# Patient Record
Sex: FEMALE | Race: BLACK OR AFRICAN AMERICAN | HISPANIC OR LATINO | Employment: UNEMPLOYED | URBAN - METROPOLITAN AREA
[De-identification: names, ages, dates, MRNs, and addresses within clinical notes are randomized per-mention and may not be internally consistent; named-entity substitution may affect disease eponyms.]

---

## 2021-07-02 ENCOUNTER — APPOINTMENT (EMERGENCY)
Dept: RADIOLOGY | Facility: HOSPITAL | Age: 46
End: 2021-07-02

## 2021-07-02 ENCOUNTER — HOSPITAL ENCOUNTER (EMERGENCY)
Facility: HOSPITAL | Age: 46
Discharge: HOME | End: 2021-07-02
Attending: EMERGENCY MEDICINE | Admitting: EMERGENCY MEDICINE
Payer: MEDICAID

## 2021-07-02 VITALS
OXYGEN SATURATION: 100 % | RESPIRATION RATE: 16 BRPM | DIASTOLIC BLOOD PRESSURE: 84 MMHG | TEMPERATURE: 96.2 F | SYSTOLIC BLOOD PRESSURE: 125 MMHG | HEART RATE: 75 BPM | WEIGHT: 169.75 LBS

## 2021-07-02 DIAGNOSIS — R10.9 ABDOMINAL PAIN: Primary | ICD-10-CM

## 2021-07-02 LAB
ALBUMIN SERPL BCP-MCNC: 4.2 G/DL (ref 3–5.2)
ALP SERPL-CCNC: 46 U/L (ref 43–122)
ALT SERPL W P-5'-P-CCNC: 12 U/L
ANION GAP SERPL CALCULATED.3IONS-SCNC: 11 MMOL/L (ref 5–14)
AST SERPL W P-5'-P-CCNC: 18 U/L (ref 14–36)
ATRIAL RATE: 81 BPM
BACTERIA UR QL AUTO: ABNORMAL /HPF
BILIRUB SERPL-MCNC: 0.6 MG/DL
BILIRUB UR QL STRIP: NEGATIVE
BUN SERPL-MCNC: 13 MG/DL (ref 5–25)
CALCIUM SERPL-MCNC: 9.2 MG/DL (ref 8.4–10.2)
CHLORIDE SERPL-SCNC: 105 MMOL/L (ref 97–108)
CLARITY UR: ABNORMAL
CO2 SERPL-SCNC: 23 MMOL/L (ref 22–30)
COLOR UR: ABNORMAL
CREAT SERPL-MCNC: 0.54 MG/DL (ref 0.6–1.2)
EOSINOPHIL # BLD AUTO: 0.16 THOUSAND/UL (ref 0–0.4)
EOSINOPHIL NFR BLD MANUAL: 2 % (ref 0–6)
ERYTHROCYTE [DISTWIDTH] IN BLOOD BY AUTOMATED COUNT: 12.8 %
EXT PREG TEST URINE: NEGATIVE
EXT. CONTROL ED NAV: NORMAL
GFR SERPL CREATININE-BSD FRML MDRD: 114 ML/MIN/1.73SQ M
GLUCOSE SERPL-MCNC: 105 MG/DL (ref 70–99)
GLUCOSE UR STRIP-MCNC: NEGATIVE MG/DL
HCT VFR BLD AUTO: 38.2 % (ref 36–46)
HGB BLD-MCNC: 13.6 G/DL (ref 12–16)
HGB UR QL STRIP.AUTO: 250
KETONES UR STRIP-MCNC: NEGATIVE MG/DL
LEUKOCYTE ESTERASE UR QL STRIP: NEGATIVE
LIPASE SERPL-CCNC: 143 U/L (ref 23–300)
LYMPHOCYTES # BLD AUTO: 3.44 THOUSAND/UL (ref 0.5–4)
LYMPHOCYTES # BLD AUTO: 42 % (ref 25–45)
MCH RBC QN AUTO: 33.5 PG (ref 26–34)
MCHC RBC AUTO-ENTMCNC: 35.6 G/DL (ref 31–36)
MCV RBC AUTO: 94 FL (ref 80–100)
MONOCYTES # BLD AUTO: 0.98 THOUSAND/UL (ref 0.2–0.9)
MONOCYTES NFR BLD AUTO: 12 % (ref 1–10)
NEUTS SEG # BLD: 3.61 THOUSAND/UL (ref 1.8–7.8)
NEUTS SEG NFR BLD AUTO: 44 %
NITRITE UR QL STRIP: NEGATIVE
NON-SQ EPI CELLS URNS QL MICRO: ABNORMAL /HPF
P AXIS: 55 DEGREES
PH UR STRIP.AUTO: 7 [PH]
PLATELET # BLD AUTO: 280 THOUSANDS/UL (ref 150–450)
PLATELET BLD QL SMEAR: ADEQUATE
PMV BLD AUTO: 8.4 FL (ref 8.9–12.7)
POTASSIUM SERPL-SCNC: 3.5 MMOL/L (ref 3.6–5)
PR INTERVAL: 172 MS
PROT SERPL-MCNC: 7.7 G/DL (ref 5.9–8.4)
PROT UR STRIP-MCNC: ABNORMAL MG/DL
QRS AXIS: 17 DEGREES
QRSD INTERVAL: 80 MS
QT INTERVAL: 398 MS
QTC INTERVAL: 462 MS
RBC # BLD AUTO: 4.06 MILLION/UL (ref 4–5.2)
RBC #/AREA URNS AUTO: ABNORMAL /HPF
RBC MORPH BLD: NORMAL
SODIUM SERPL-SCNC: 139 MMOL/L (ref 137–147)
SP GR UR STRIP.AUTO: 1.01 (ref 1–1.04)
T WAVE AXIS: 46 DEGREES
TOTAL CELLS COUNTED SPEC: 100
TROPONIN I SERPL-MCNC: <0.01 NG/ML (ref 0–0.03)
UROBILINOGEN UA: NEGATIVE MG/DL
VENTRICULAR RATE: 81 BPM
WBC # BLD AUTO: 8.2 THOUSAND/UL (ref 4.5–11)
WBC #/AREA URNS AUTO: ABNORMAL /HPF

## 2021-07-02 PROCEDURE — 71045 X-RAY EXAM CHEST 1 VIEW: CPT

## 2021-07-02 PROCEDURE — 85027 COMPLETE CBC AUTOMATED: CPT | Performed by: EMERGENCY MEDICINE

## 2021-07-02 PROCEDURE — 83690 ASSAY OF LIPASE: CPT | Performed by: EMERGENCY MEDICINE

## 2021-07-02 PROCEDURE — 96361 HYDRATE IV INFUSION ADD-ON: CPT

## 2021-07-02 PROCEDURE — 99284 EMERGENCY DEPT VISIT MOD MDM: CPT | Performed by: EMERGENCY MEDICINE

## 2021-07-02 PROCEDURE — 93005 ELECTROCARDIOGRAM TRACING: CPT

## 2021-07-02 PROCEDURE — 96375 TX/PRO/DX INJ NEW DRUG ADDON: CPT

## 2021-07-02 PROCEDURE — 85007 BL SMEAR W/DIFF WBC COUNT: CPT | Performed by: EMERGENCY MEDICINE

## 2021-07-02 PROCEDURE — 80053 COMPREHEN METABOLIC PANEL: CPT | Performed by: EMERGENCY MEDICINE

## 2021-07-02 PROCEDURE — 99284 EMERGENCY DEPT VISIT MOD MDM: CPT

## 2021-07-02 PROCEDURE — 96374 THER/PROPH/DIAG INJ IV PUSH: CPT

## 2021-07-02 PROCEDURE — 84484 ASSAY OF TROPONIN QUANT: CPT | Performed by: EMERGENCY MEDICINE

## 2021-07-02 PROCEDURE — 81025 URINE PREGNANCY TEST: CPT | Performed by: EMERGENCY MEDICINE

## 2021-07-02 PROCEDURE — 36415 COLL VENOUS BLD VENIPUNCTURE: CPT | Performed by: EMERGENCY MEDICINE

## 2021-07-02 PROCEDURE — 81001 URINALYSIS AUTO W/SCOPE: CPT | Performed by: EMERGENCY MEDICINE

## 2021-07-02 PROCEDURE — 93010 ELECTROCARDIOGRAM REPORT: CPT | Performed by: INTERNAL MEDICINE

## 2021-07-02 RX ORDER — ONDANSETRON 4 MG/1
4 TABLET, ORALLY DISINTEGRATING ORAL EVERY 8 HOURS PRN
Qty: 20 TABLET | Refills: 0 | Status: SHIPPED | OUTPATIENT
Start: 2021-07-02

## 2021-07-02 RX ORDER — ONDANSETRON 2 MG/ML
4 INJECTION INTRAMUSCULAR; INTRAVENOUS ONCE
Status: COMPLETED | OUTPATIENT
Start: 2021-07-02 | End: 2021-07-02

## 2021-07-02 RX ORDER — PANTOPRAZOLE SODIUM 20 MG/1
20 TABLET, DELAYED RELEASE ORAL DAILY
Qty: 20 TABLET | Refills: 0 | Status: SHIPPED | OUTPATIENT
Start: 2021-07-02

## 2021-07-02 RX ADMIN — MORPHINE SULFATE 2 MG: 2 INJECTION, SOLUTION INTRAMUSCULAR; INTRAVENOUS at 01:32

## 2021-07-02 RX ADMIN — ONDANSETRON 4 MG: 2 INJECTION INTRAMUSCULAR; INTRAVENOUS at 01:30

## 2021-07-02 RX ADMIN — SODIUM CHLORIDE 1000 ML: 0.9 INJECTION, SOLUTION INTRAVENOUS at 01:28

## 2021-07-02 NOTE — ED NOTES
Patient reports that her pain is gone and she is resting quietly  Dr Lucy Irvin in to see patient to review results of testing done and follow up and discharge instructions with patient using Burmese interpretor       Vito Doyle RN  07/02/21 2923

## 2021-07-02 NOTE — ED NOTES
Patient reports that her pain has improved since receiving the morphine  States she is feeling better  Has given permission to speak with her sister       Tere Catalan RN  07/02/21 4901

## 2021-07-02 NOTE — ED PROVIDER NOTES
History  Chief Complaint   Patient presents with    Abdominal Pain     Abdominal pain that started 40 minutes ago, mid-epigastric pain, did not take anything at home for pain  59-year-old female presents with complaint of abdominal pain  She states that approximately 45 minutes prior to arrival she awoke with epigastric pain  The patient reports that she felt warm but did not check her temperature  She has had nausea but no vomiting or diarrhea  She took no medications for her pain  Abdominal Pain  Pain location:  Epigastric  Pain quality: aching and pressure    Pain radiates to:  Does not radiate  Pain severity:  Severe  Onset quality:  Sudden  Duration: 45 minutes  Timing:  Constant  Progression:  Unchanged  Chronicity:  New  Relieved by:  Nothing  Worsened by:  Nothing  Ineffective treatments:  None tried  Associated symptoms: anorexia and nausea    Associated symptoms: no belching, no chest pain, no chills, no constipation, no cough, no diarrhea, no dysuria, no fatigue, no fever, no flatus, no hematemesis, no hematochezia, no hematuria, no melena, no shortness of breath, no sore throat and no vomiting        None       Past Medical History:   Diagnosis Date    H  pylori infection     Hypertension        History reviewed  No pertinent surgical history  History reviewed  No pertinent family history  I have reviewed and agree with the history as documented  E-Cigarette/Vaping     E-Cigarette/Vaping Substances     Social History     Tobacco Use    Smoking status: Never Smoker    Smokeless tobacco: Never Used   Substance Use Topics    Alcohol use: Not Currently    Drug use: Never       Review of Systems   Constitutional: Negative for chills, fatigue and fever  HENT: Negative for postnasal drip, sinus pain, sore throat and trouble swallowing  Eyes: Negative for redness and itching  Respiratory: Negative for cough, chest tightness, shortness of breath and wheezing      Cardiovascular: Negative for chest pain and leg swelling  Gastrointestinal: Positive for abdominal pain, anorexia and nausea  Negative for constipation, diarrhea, flatus, hematemesis, hematochezia, melena and vomiting  Endocrine: Negative  Genitourinary: Negative for difficulty urinating, dysuria and hematuria  Musculoskeletal: Negative for back pain and myalgias  Skin: Negative for rash  Allergic/Immunologic: Negative  Neurological: Negative for dizziness, numbness and headaches  Hematological: Negative  Psychiatric/Behavioral: Negative  All other systems reviewed and are negative  Physical Exam  Physical Exam  Vitals and nursing note reviewed  Constitutional:       General: She is in acute distress  Appearance: Normal appearance  She is well-developed  She is not ill-appearing or toxic-appearing  HENT:      Head: Normocephalic and atraumatic  Right Ear: External ear normal       Left Ear: External ear normal       Nose: Nose normal  No congestion  Mouth/Throat:      Mouth: Mucous membranes are moist       Pharynx: Oropharynx is clear  Eyes:      Conjunctiva/sclera: Conjunctivae normal       Pupils: Pupils are equal, round, and reactive to light  Cardiovascular:      Rate and Rhythm: Normal rate and regular rhythm  Heart sounds: Normal heart sounds  Pulmonary:      Effort: Pulmonary effort is normal  No respiratory distress  Breath sounds: Normal breath sounds  No wheezing  Abdominal:      General: Bowel sounds are normal       Palpations: Abdomen is soft  Tenderness: There is abdominal tenderness in the epigastric area  There is no guarding  Musculoskeletal:         General: No tenderness or deformity  Cervical back: Normal range of motion and neck supple  No rigidity  Skin:     General: Skin is warm and dry  Capillary Refill: Capillary refill takes less than 2 seconds  Findings: No rash     Neurological:      General: No focal deficit present  Mental Status: She is alert and oriented to person, place, and time     Psychiatric:         Mood and Affect: Mood normal          Behavior: Behavior normal          Vital Signs  ED Triage Vitals [07/02/21 0110]   Temperature Pulse Respirations Blood Pressure SpO2   (!) 96 2 °F (35 7 °C) 95 18 (!) 174/106 99 %      Temp Source Heart Rate Source Patient Position - Orthostatic VS BP Location FiO2 (%)   Tympanic Monitor Lying Left arm --      Pain Score       Worst Possible Pain           Vitals:    07/02/21 0137 07/02/21 0145 07/02/21 0200 07/02/21 0215   BP: 135/91 133/95 131/87 132/88   Pulse: 75 79 72 80   Patient Position - Orthostatic VS: Lying  Sitting Sitting         Visual Acuity      ED Medications  Medications   sodium chloride 0 9 % bolus 1,000 mL (1,000 mL Intravenous New Bag 7/2/21 0128)   ondansetron (ZOFRAN) injection 4 mg (4 mg Intravenous Given 7/2/21 0130)   morphine injection 2 mg (2 mg Intravenous Given 7/2/21 0132)       Diagnostic Studies  Results Reviewed     Procedure Component Value Units Date/Time    Troponin I [[id number]]  (Normal) Collected: 07/02/21 0127    Lab Status: Final result Specimen: Blood from Arm, Right Updated: 07/02/21 0155     Troponin I <0 01 ng/mL     Manual Differential (Non Wam) [[id number]]  (Abnormal) Collected: 07/02/21 0127    Lab Status: Final result Specimen: Blood from Arm, Right Updated: 07/02/21 0154     Segmented % 44 %      Lymphocytes % 42 %      Monocytes % 12 %      Eosinophils, % 2 %      Neutrophils Absolute 3 61 Thousand/uL      Lymphocytes Absolute 3 44 Thousand/uL      Monocytes Absolute 0 98 Thousand/uL      Eosinophils Absolute 0 16 Thousand/uL      Total Counted 100     RBC Morphology Normal     Platelet Estimate Adequate    Urine Microscopic [[id number]]  (Abnormal) Collected: 07/02/21 0139    Lab Status: Final result Specimen: Urine, Other Updated: 07/02/21 0154     RBC, UA Innumerable /hpf      WBC, UA 1-2 /hpf      Epithelial Cells Occasional /hpf      Bacteria, UA Innumerable /hpf     UA (URINE) with reflex to Scope [[id number]]  (Abnormal) Collected: 07/02/21 0139    Lab Status: Final result Specimen: Urine, Other Updated: 07/02/21 0153     Color, UA Straw     Clarity, UA Cloudy     Specific Gravity, UA 1 015     pH, UA 7 0     Leukocytes, UA Negative     Nitrite, UA Negative     Protein, UA 30 (1+) mg/dl      Glucose, UA Negative mg/dl      Ketones, UA Negative mg/dl      Bilirubin, UA Negative     Blood,  0     UROBILINOGEN UA Negative mg/dL     Lipase [[id number]]  (Normal) Collected: 07/02/21 0127    Lab Status: Final result Specimen: Blood from Arm, Right Updated: 07/02/21 0147     Lipase 143 u/L     Comprehensive metabolic panel [[id number]]  (Abnormal) Collected: 07/02/21 0127    Lab Status: Final result Specimen: Blood from Arm, Right Updated: 07/02/21 0146     Sodium 139 mmol/L      Potassium 3 5 mmol/L      Chloride 105 mmol/L      CO2 23 mmol/L      ANION GAP 11 mmol/L      BUN 13 mg/dL      Creatinine 0 54 mg/dL      Glucose 105 mg/dL      Calcium 9 2 mg/dL      AST 18 U/L      ALT 12 U/L      Alkaline Phosphatase 46 U/L      Total Protein 7 7 g/dL      Albumin 4 2 g/dL      Total Bilirubin 0 60 mg/dL      eGFR 114 ml/min/1 73sq m     Narrative:      [place] guidelines for Chronic Kidney Disease (CKD):     Stage 1 with normal or high GFR (GFR > 90 mL/min/1 73 square meters)    Stage 2 Mild CKD (GFR = 60-89 mL/min/1 73 square meters)    Stage 3A Moderate CKD (GFR = 45-59 mL/min/1 73 square meters)    Stage 3B Moderate CKD (GFR = 30-44 mL/min/1 73 square meters)    Stage 4 Severe CKD (GFR = 15-29 mL/min/1 73 square meters)    Stage 5 End Stage CKD (GFR <15 mL/min/1 73 square meters)  Note: GFR calculation is accurate only with a steady state creatinine    CBC and differential [[id number]]  (Abnormal) Collected: 07/02/21 0127    Lab Status: Final result Specimen: Blood from Arm, Right Updated: 07/02/21 0135     WBC 8 20 Thousand/uL      RBC 4 06 Million/uL      Hemoglobin 13 6 g/dL      Hematocrit 38 2 %      MCV 94 fL      MCH 33 5 pg      MCHC 35 6 g/dL      RDW 12 8 %      MPV 8 4 fL      Platelets 648 Thousands/uL     POCT pregnancy, urine [[id number]]  (Normal) Resulted: 07/02/21 0132    Lab Status: Final result Updated: 07/02/21 0132     EXT PREG TEST UR (Ref: Negative) negative     Control valid                 XR chest 1 view portable    (Results Pending)              Procedures  ECG 12 Lead Documentation Only    Date/Time: 7/2/2021 1:36 AM  Performed by: Alma Delia Johnson DO  Authorized by: Alma Delia Johnson DO     ECG reviewed by me, the ED Provider: yes    Patient location:  ED  Rate:     ECG rate:  81    ECG rate assessment: normal    Rhythm:     Rhythm: sinus rhythm    Ectopy:     Ectopy: none    QRS:     QRS axis:  Normal  Conduction:     Conduction: normal    ST segments:     ST segments:  Normal  T waves:     T waves: non-specific               ED Course                             SBIRT 20yo+      Most Recent Value   SBIRT (22 yo +)   In order to provide better care to our patients, we are screening all of our patients for alcohol and drug use  Would it be okay to ask you these screening questions? Yes Filed at: 07/02/2021 0121   Initial Alcohol Screen: US AUDIT-C    1  How often do you have a drink containing alcohol?  0 Filed at: 07/02/2021 0121   2  How many drinks containing alcohol do you have on a typical day you are drinking? 0 Filed at: 07/02/2021 0121   3a  Male UNDER 65: How often do you have five or more drinks on one occasion? 0 Filed at: 07/02/2021 0121   3b  FEMALE Any Age, or MALE 65+: How often do you have 4 or more drinks on one occassion? 0 Filed at: 07/02/2021 0121   Audit-C Score  0 Filed at: 07/02/2021 0121   DARREN: How many times in the past year have you    Used an illegal drug or used a prescription medication for non-medical reasons?   Never Filed at: 07/02/2021 5                    MDM  Number of Diagnoses or Management Options  Abdominal pain  Diagnosis management comments: 79-year-old female presents with complaint of abdominal pain along with nausea  Pain awoke her from sleep and had continued until time of presentation  Patient does report having history of gastritis and an H pylori infection  After receiving meds and fluids, the patient's symptoms resolved  Patient recently relocated from [place] and is in the process of establishing with a primary care provider  She requested medications for her stomach and she will be following up closely as an outpatient  She is aware of reasons return to the ER  Amount and/or Complexity of Data Reviewed  Clinical lab tests: ordered and reviewed  Tests in the radiology section of CPT®: ordered and reviewed  Independent visualization of images, tracings, or specimens: yes        Disposition  Final diagnoses:   Abdominal pain     Time reflects when diagnosis was documented in both MDM as applicable and the Disposition within this note     Time User Action Codes Description Comment    7/2/2021  2:27 AM Dash Browne Add [R10 9] Abdominal pain       ED Disposition     ED Disposition Condition Date/Time Comment    Discharge Stable Fri Jul 2, 2021  2:27 AM Brayden Knapp discharge to home/self care              Follow-up Information     Follow up With Specialties Details Why [address] Lacy Hogue MD Family Medicine   [address] [place] [address]  [phone]            Patient's Medications   Discharge Prescriptions    ONDANSETRON (ZOFRAN-ODT) 4 MG DISINTEGRATING TABLET    Take 1 tablet (4 mg total) by mouth every 8 (eight) hours as needed for nausea or vomiting       Start Date: 7/2/2021  End Date: --       Order Dose: 4 mg       Quantity: 20 tablet    Refills: 0    PANTOPRAZOLE (PROTONIX) 20 MG TABLET    Take 1 tablet (20 mg total) by mouth daily       Start Date: 7/2/2021  End Date: -- Order Dose: 20 mg       Quantity: 20 tablet    Refills: 0     No discharge procedures on file      PDMP Review     None          ED Provider  Electronically Signed by           Marah Howe DO  07/02/21 2519

## 2022-08-11 ENCOUNTER — APPOINTMENT (OUTPATIENT)
Dept: LAB | Facility: HOSPITAL | Age: 47
End: 2022-08-11
Payer: COMMERCIAL

## 2022-08-11 ENCOUNTER — OFFICE VISIT (OUTPATIENT)
Dept: FAMILY MEDICINE CLINIC | Facility: CLINIC | Age: 47
End: 2022-08-11

## 2022-08-11 VITALS
SYSTOLIC BLOOD PRESSURE: 136 MMHG | WEIGHT: 167 LBS | TEMPERATURE: 96.7 F | RESPIRATION RATE: 16 BRPM | OXYGEN SATURATION: 98 % | DIASTOLIC BLOOD PRESSURE: 90 MMHG | HEART RATE: 80 BPM | BODY MASS INDEX: 28.51 KG/M2 | HEIGHT: 64 IN

## 2022-08-11 DIAGNOSIS — N93.8 DYSFUNCTIONAL UTERINE BLEEDING: Primary | ICD-10-CM

## 2022-08-11 DIAGNOSIS — E78.5 HYPERLIPIDEMIA, UNSPECIFIED HYPERLIPIDEMIA TYPE: ICD-10-CM

## 2022-08-11 DIAGNOSIS — I10 PRIMARY HYPERTENSION: ICD-10-CM

## 2022-08-11 LAB
ALBUMIN SERPL BCP-MCNC: 4.2 G/DL (ref 3.5–5)
ALP SERPL-CCNC: 55 U/L (ref 43–122)
ALT SERPL W P-5'-P-CCNC: 12 U/L
ANION GAP SERPL CALCULATED.3IONS-SCNC: 7 MMOL/L (ref 5–14)
AST SERPL W P-5'-P-CCNC: 16 U/L (ref 14–36)
BASOPHILS # BLD AUTO: 0.03 THOUSANDS/ΜL (ref 0–0.1)
BASOPHILS NFR BLD AUTO: 0 % (ref 0–1)
BILIRUB SERPL-MCNC: 0.43 MG/DL (ref 0.2–1)
BUN SERPL-MCNC: 15 MG/DL (ref 5–25)
CALCIUM SERPL-MCNC: 8.8 MG/DL (ref 8.4–10.2)
CHLORIDE SERPL-SCNC: 106 MMOL/L (ref 96–108)
CO2 SERPL-SCNC: 25 MMOL/L (ref 21–32)
CREAT SERPL-MCNC: 0.76 MG/DL (ref 0.6–1.2)
EOSINOPHIL # BLD AUTO: 0.14 THOUSAND/ΜL (ref 0–0.61)
EOSINOPHIL NFR BLD AUTO: 2 % (ref 0–6)
ERYTHROCYTE [DISTWIDTH] IN BLOOD BY AUTOMATED COUNT: 12.5 % (ref 11.6–15.1)
FERRITIN SERPL-MCNC: 14 NG/ML (ref 8–388)
GFR SERPL CREATININE-BSD FRML MDRD: 93 ML/MIN/1.73SQ M
GLUCOSE SERPL-MCNC: 96 MG/DL (ref 70–99)
HCT VFR BLD AUTO: 37.7 % (ref 34.8–46.1)
HGB BLD-MCNC: 12.5 G/DL (ref 11.5–15.4)
IMM GRANULOCYTES # BLD AUTO: 0.02 THOUSAND/UL (ref 0–0.2)
IMM GRANULOCYTES NFR BLD AUTO: 0 % (ref 0–2)
IRON SATN MFR SERPL: 17 % (ref 15–50)
IRON SERPL-MCNC: 68 UG/DL (ref 50–170)
LYMPHOCYTES # BLD AUTO: 3.17 THOUSANDS/ΜL (ref 0.6–4.47)
LYMPHOCYTES NFR BLD AUTO: 39 % (ref 14–44)
MCH RBC QN AUTO: 31.1 PG (ref 26.8–34.3)
MCHC RBC AUTO-ENTMCNC: 33.2 G/DL (ref 31.4–37.4)
MCV RBC AUTO: 94 FL (ref 82–98)
MONOCYTES # BLD AUTO: 0.79 THOUSAND/ΜL (ref 0.17–1.22)
MONOCYTES NFR BLD AUTO: 10 % (ref 4–12)
NEUTROPHILS # BLD AUTO: 4 THOUSANDS/ΜL (ref 1.85–7.62)
NEUTS SEG NFR BLD AUTO: 49 % (ref 43–75)
NRBC BLD AUTO-RTO: 0 /100 WBCS
PLATELET # BLD AUTO: 289 THOUSANDS/UL (ref 149–390)
PMV BLD AUTO: 9.8 FL (ref 8.9–12.7)
POTASSIUM SERPL-SCNC: 4.1 MMOL/L (ref 3.5–5.3)
PROT SERPL-MCNC: 7.7 G/DL (ref 6.4–8.4)
RBC # BLD AUTO: 4.02 MILLION/UL (ref 3.81–5.12)
SODIUM SERPL-SCNC: 138 MMOL/L (ref 135–147)
TIBC SERPL-MCNC: 410 UG/DL (ref 250–450)
WBC # BLD AUTO: 8.15 THOUSAND/UL (ref 4.31–10.16)

## 2022-08-11 PROCEDURE — 83540 ASSAY OF IRON: CPT | Performed by: FAMILY MEDICINE

## 2022-08-11 PROCEDURE — 36415 COLL VENOUS BLD VENIPUNCTURE: CPT | Performed by: FAMILY MEDICINE

## 2022-08-11 PROCEDURE — 85025 COMPLETE CBC W/AUTO DIFF WBC: CPT | Performed by: FAMILY MEDICINE

## 2022-08-11 PROCEDURE — 83550 IRON BINDING TEST: CPT | Performed by: FAMILY MEDICINE

## 2022-08-11 PROCEDURE — 3725F SCREEN DEPRESSION PERFORMED: CPT | Performed by: FAMILY MEDICINE

## 2022-08-11 PROCEDURE — 82728 ASSAY OF FERRITIN: CPT | Performed by: FAMILY MEDICINE

## 2022-08-11 PROCEDURE — 80053 COMPREHEN METABOLIC PANEL: CPT | Performed by: FAMILY MEDICINE

## 2022-08-11 PROCEDURE — 99204 OFFICE O/P NEW MOD 45 MIN: CPT | Performed by: FAMILY MEDICINE

## 2022-08-11 RX ORDER — AMLODIPINE BESYLATE 5 MG/1
5 TABLET ORAL DAILY
Qty: 30 TABLET | Refills: 2 | Status: SHIPPED | OUTPATIENT
Start: 2022-08-11 | End: 2022-09-28 | Stop reason: SDUPTHER

## 2022-08-11 RX ORDER — ATORVASTATIN CALCIUM 10 MG/1
10 TABLET, FILM COATED ORAL DAILY
Qty: 30 TABLET | Refills: 2 | Status: SHIPPED | OUTPATIENT
Start: 2022-08-11 | End: 2022-09-28 | Stop reason: SDUPTHER

## 2022-08-11 RX ORDER — MEDROXYPROGESTERONE ACETATE 5 MG/1
5 TABLET ORAL DAILY
Qty: 30 TABLET | Refills: 1 | Status: SHIPPED | OUTPATIENT
Start: 2022-08-11 | End: 2022-08-22

## 2022-08-11 NOTE — PROGRESS NOTES
Assessment/Plan:    Diagnoses and all orders for this visit:    Dysfunctional uterine bleeding  -     CBC and differential  -     Comprehensive metabolic panel  -     Ambulatory referral to Gynecology; Future  -     medroxyPROGESTERone (PROVERA) 5 mg tablet; Take 1 tablet (5 mg total) by mouth daily  -     Iron Panel (Includes Ferritin, Iron Sat%, Iron, and TIBC); Future  -     Iron Panel (Includes Ferritin, Iron Sat%, Iron, and TIBC)    Primary hypertension  -     amLODIPine (NORVASC) 5 mg tablet; Take 1 tablet (5 mg total) by mouth daily    Hyperlipidemia, unspecified hyperlipidemia type  -     atorvastatin (LIPITOR) 10 mg tablet; Take 1 tablet (10 mg total) by mouth daily        Subjective:     VENU Farr is a 49 y o  female  who presented to the office today for a SAME DAY VISIT with complaint of heavy and prolonged menstrual bleeding  She started her period 8-10 days late on 7/31/2022  The first 2 days was heavy and then decreased, but never stopped  Now the bleeding is still heavy with clots, and causing lower abdominal pain  She has changed 4 pads today and is also feeling some intermittent chest pain intermittently at night and  shortness of breath with prolonged walking  H/o of Hypertension take Amlodipine daily, an HLD but has not been taking it     The following portions of the patient's history were reviewed and updated as appropriate: allergies, current medications, past family history, past medical history, past social history, past surgical history and problem list       Current Outpatient Medications:     amLODIPine (NORVASC) 5 mg tablet, Take 1 tablet (5 mg total) by mouth daily, Disp: 30 tablet, Rfl: 2    atorvastatin (LIPITOR) 10 mg tablet, Take 1 tablet (10 mg total) by mouth daily, Disp: 30 tablet, Rfl: 2    medroxyPROGESTERone (PROVERA) 5 mg tablet, Take 1 tablet (5 mg total) by mouth daily, Disp: 30 tablet, Rfl: 1    ondansetron (ZOFRAN-ODT) 4 mg disintegrating tablet, Take 1 tablet (4 mg total) by mouth every 8 (eight) hours as needed for nausea or vomiting, Disp: 20 tablet, Rfl: 0    pantoprazole (PROTONIX) 20 mg tablet, Take 1 tablet (20 mg total) by mouth daily, Disp: 20 tablet, Rfl: 0    Recent Results (from the past 672 hour(s))   CBC and differential    Collection Time: 08/11/22  5:37 PM   Result Value Ref Range    WBC 8 15 4 31 - 10 16 Thousand/uL    RBC 4 02 3 81 - 5 12 Million/uL    Hemoglobin 12 5 11 5 - 15 4 g/dL    Hematocrit 37 7 34 8 - 46 1 %    MCV 94 82 - 98 fL    MCH 31 1 26 8 - 34 3 pg    MCHC 33 2 31 4 - 37 4 g/dL    RDW 12 5 11 6 - 15 1 %    MPV 9 8 8 9 - 12 7 fL    Platelets 667 285 - 554 Thousands/uL    nRBC 0 /100 WBCs    Neutrophils Relative 49 43 - 75 %    Immat GRANS % 0 0 - 2 %    Lymphocytes Relative 39 14 - 44 %    Monocytes Relative 10 4 - 12 %    Eosinophils Relative 2 0 - 6 %    Basophils Relative 0 0 - 1 %    Neutrophils Absolute 4 00 1 85 - 7 62 Thousands/µL    Immature Grans Absolute 0 02 0 00 - 0 20 Thousand/uL    Lymphocytes Absolute 3 17 0 60 - 4 47 Thousands/µL    Monocytes Absolute 0 79 0 17 - 1 22 Thousand/µL    Eosinophils Absolute 0 14 0 00 - 0 61 Thousand/µL    Basophils Absolute 0 03 0 00 - 0 10 Thousands/µL   Comprehensive metabolic panel    Collection Time: 08/11/22  5:37 PM   Result Value Ref Range    Sodium 138 135 - 147 mmol/L    Potassium 4 1 3 5 - 5 3 mmol/L    Chloride 106 96 - 108 mmol/L    CO2 25 21 - 32 mmol/L    ANION GAP 7 5 - 14 mmol/L    BUN 15 5 - 25 mg/dL    Creatinine 0 76 0 60 - 1 20 mg/dL    Glucose 96 70 - 99 mg/dL    Calcium 8 8 8 4 - 10 2 mg/dL    AST 16 14 - 36 U/L    ALT 12 <35 U/L    Alkaline Phosphatase 55 43 - 122 U/L    Total Protein 7 7 6 4 - 8 4 g/dL    Albumin 4 2 3 5 - 5 0 g/dL    Total Bilirubin 0 43 0 20 - 1 00 mg/dL    eGFR 93 ml/min/1 73sq m   Iron Saturation %    Collection Time: 08/11/22  5:37 PM   Result Value Ref Range    Iron Saturation 17 15 - 50 %    TIBC 410 250 - 450 ug/dL    Iron 68 50 - 170 ug/dL   Ferritin    Collection Time: 08/11/22  5:37 PM   Result Value Ref Range    Ferritin 14 8 - 388 ng/mL        Review of Systems   Constitutional: Negative for chills and fever  HENT: Negative for congestion, rhinorrhea and sore throat  Respiratory: Positive for shortness of breath  Negative for cough  Cardiovascular: Positive for chest pain  Gastrointestinal: Negative for diarrhea, nausea and vomiting  Skin: Negative for rash  Neurological: Negative for dizziness and headaches  Objective:    /90 (BP Location: Right arm, Patient Position: Sitting, Cuff Size: Standard)   Pulse 80   Temp (!) 96 7 °F (35 9 °C) (Temporal)   Resp 16   Ht 5' 4" (1 626 m)   Wt 75 8 kg (167 lb)   SpO2 98%   BMI 28 67 kg/m²     Physical Exam  Vitals and nursing note reviewed  Constitutional:       Appearance: She is well-developed  HENT:      Head: Normocephalic and atraumatic  Right Ear: External ear normal       Left Ear: External ear normal    Eyes:      General: Lids are normal       Extraocular Movements: Extraocular movements intact  Conjunctiva/sclera: Conjunctivae normal       Comments: Pallor +   Cardiovascular:      Rate and Rhythm: Normal rate and regular rhythm  Heart sounds: Normal heart sounds  Pulmonary:      Effort: Pulmonary effort is normal  No respiratory distress  Musculoskeletal:      Right lower leg: No edema  Left lower leg: No edema  Neurological:      Mental Status: She is alert and oriented to person, place, and time     Psychiatric:         Mood and Affect: Mood normal          Behavior: Behavior normal          Ninoska Thompson MD  08/15/22  6:42 AM

## 2022-08-12 ENCOUNTER — TELEPHONE (OUTPATIENT)
Dept: OTHER | Facility: OTHER | Age: 47
End: 2022-08-12

## 2022-08-12 NOTE — TELEPHONE ENCOUNTER
PT  Called in requesting a call back regarding an appointment she states she made with office on 08/22

## 2022-08-15 ENCOUNTER — TELEPHONE (OUTPATIENT)
Dept: FAMILY MEDICINE CLINIC | Facility: CLINIC | Age: 47
End: 2022-08-15

## 2022-08-15 PROBLEM — E78.5 HYPERLIPIDEMIA: Status: ACTIVE | Noted: 2022-08-15

## 2022-08-15 PROBLEM — I10 PRIMARY HYPERTENSION: Status: ACTIVE | Noted: 2022-08-15

## 2022-08-15 PROBLEM — N93.8 DYSFUNCTIONAL UTERINE BLEEDING: Status: ACTIVE | Noted: 2022-08-15

## 2022-08-16 DIAGNOSIS — N93.8 DYSFUNCTIONAL UTERINE BLEEDING: Primary | ICD-10-CM

## 2022-08-16 RX ORDER — IRON,CARBONYL/ASCORBIC ACID 65MG-125MG
TABLET, DELAYED RELEASE (ENTERIC COATED) ORAL
Qty: 30 TABLET | Refills: 2 | Status: SHIPPED | OUTPATIENT
Start: 2022-08-16

## 2022-08-16 NOTE — TELEPHONE ENCOUNTER
Returned pt's phone call    She states is still bleeding with the Progesterone 5 mg so I advised her to increase it to 15 mg daily until seen by the Gyn next week on 8/22/22

## 2022-08-22 ENCOUNTER — OFFICE VISIT (OUTPATIENT)
Dept: OBGYN CLINIC | Facility: CLINIC | Age: 47
End: 2022-08-22

## 2022-08-22 ENCOUNTER — APPOINTMENT (OUTPATIENT)
Dept: LAB | Facility: HOSPITAL | Age: 47
End: 2022-08-22
Attending: NURSE PRACTITIONER
Payer: COMMERCIAL

## 2022-08-22 VITALS
SYSTOLIC BLOOD PRESSURE: 135 MMHG | DIASTOLIC BLOOD PRESSURE: 88 MMHG | BODY MASS INDEX: 28.51 KG/M2 | HEIGHT: 64 IN | HEART RATE: 76 BPM | WEIGHT: 167 LBS

## 2022-08-22 DIAGNOSIS — N93.8 DYSFUNCTIONAL UTERINE BLEEDING: ICD-10-CM

## 2022-08-22 DIAGNOSIS — N93.9 ABNORMAL UTERINE BLEEDING (AUB): ICD-10-CM

## 2022-08-22 DIAGNOSIS — N93.9 ABNORMAL UTERINE BLEEDING (AUB): Primary | ICD-10-CM

## 2022-08-22 LAB — TSH SERPL DL<=0.05 MIU/L-ACNC: 1 UIU/ML (ref 0.45–4.5)

## 2022-08-22 PROCEDURE — 99203 OFFICE O/P NEW LOW 30 MIN: CPT | Performed by: NURSE PRACTITIONER

## 2022-08-22 PROCEDURE — 84443 ASSAY THYROID STIM HORMONE: CPT

## 2022-08-22 PROCEDURE — 83001 ASSAY OF GONADOTROPIN (FSH): CPT

## 2022-08-22 PROCEDURE — 36415 COLL VENOUS BLD VENIPUNCTURE: CPT

## 2022-08-22 RX ORDER — MEDROXYPROGESTERONE ACETATE 10 MG/1
10 TABLET ORAL DAILY
Qty: 30 TABLET | Refills: 0 | Status: SHIPPED | OUTPATIENT
Start: 2022-08-22

## 2022-08-22 RX ORDER — AMLODIPINE BESYLATE AND ATORVASTATIN CALCIUM 5; 10 MG/1; MG/1
1 TABLET, FILM COATED ORAL DAILY
COMMUNITY
End: 2022-08-22

## 2022-08-22 NOTE — PATIENT INSTRUCTIONS
Justin por evans confianza en nuestro equipo  Mel Innocent y agradecemos nohelia comentarios  Si recibe norman encuesta nuestra, tómese unos momentos para informarnos cómo estamos     Sinceramente,  HAYDER Kathleen

## 2022-08-22 NOTE — PROGRESS NOTES
PROBLEM GYNECOLOGICAL VISIT    Terrence Ruano is a 49 y o  female who presents today with complaint of AUB  Her general medical history has been reviewed and she reports it as follows:    Past Medical History:   Diagnosis Date    H  pylori infection     Hyperlipidemia     Hypertension      Past Surgical History:   Procedure Laterality Date     SECTION      ,     TUBAL LIGATION Bilateral      OB History        2    Para   2    Term   2       0    AB   0    Living   2       SAB   0    IAB   0    Ectopic   0    Multiple   0    Live Births   2               Social History     Tobacco Use    Smoking status: Never Smoker    Smokeless tobacco: Never Used   Vaping Use    Vaping Use: Never used   Substance Use Topics    Alcohol use: Yes     Comment: couple times/year    Drug use: Never     Social History     Substance and Sexual Activity   Sexual Activity Yes    Partners: Male    Birth control/protection: Female Sterilization       Current Outpatient Medications   Medication Instructions    amLODIPine (NORVASC) 5 mg, Oral, Daily    atorvastatin (LIPITOR) 10 mg, Oral, Daily    Iron-Vitamin C (Vitron-C)  MG TABS Take one tablet daily    medroxyPROGESTERone (PROVERA) 5 mg, Oral, Daily    pantoprazole (PROTONIX) 20 mg, Oral, Daily       History of Present Illness:   Reports menses are generally very regular and normal   She has never had issues with them in the past until her most recent menses started 2022 and has not stopped since then  She was seen by her PCP on 22 and they prescribed Provera 5mg daily which she reports she has been taking without and relief from vaginal bleeding  Her PCP also checked H&H which was stable at 12 5 & 37 7  Denies significant pelvic pain but reports occasional mild uterine cramping  Review of Systems:  Review of Systems   Constitutional: Negative  Gastrointestinal: Negative      Genitourinary: Positive for menstrual problem and vaginal bleeding  Negative for pelvic pain  Physical Exam:  /88   Pulse 76   Ht 5' 4" (1 626 m)   Wt 75 8 kg (167 lb)   LMP 07/31/2022   BMI 28 67 kg/m²   Physical Exam  Constitutional:       General: She is not in acute distress  Neurological:      Mental Status: She is alert  Skin:     General: Skin is warm and dry  Vitals reviewed  Assessment:   1  AUB  Plan:   1  Imaging ordered: pelvic ultrasound  2  Bloodwork ordered: FSH, TSH  3  Return to office 1 week after ultrasound to review results with OBGYN physician  4  Given Rx Provera 10mg to take daily

## 2022-08-23 LAB — FSH SERPL-ACNC: 5.8 MIU/ML

## 2022-08-26 ENCOUNTER — HOSPITAL ENCOUNTER (OUTPATIENT)
Dept: ULTRASOUND IMAGING | Facility: HOSPITAL | Age: 47
End: 2022-08-26
Attending: NURSE PRACTITIONER
Payer: COMMERCIAL

## 2022-08-26 DIAGNOSIS — N93.9 ABNORMAL UTERINE BLEEDING (AUB): ICD-10-CM

## 2022-08-26 PROCEDURE — 76830 TRANSVAGINAL US NON-OB: CPT

## 2022-08-26 PROCEDURE — 76856 US EXAM PELVIC COMPLETE: CPT

## 2022-09-09 ENCOUNTER — OFFICE VISIT (OUTPATIENT)
Dept: OBGYN CLINIC | Facility: CLINIC | Age: 47
End: 2022-09-09

## 2022-09-09 VITALS
BODY MASS INDEX: 28.75 KG/M2 | HEIGHT: 64 IN | HEART RATE: 79 BPM | SYSTOLIC BLOOD PRESSURE: 126 MMHG | WEIGHT: 168.4 LBS | DIASTOLIC BLOOD PRESSURE: 83 MMHG

## 2022-09-09 DIAGNOSIS — N93.9 ABNORMAL UTERINE BLEEDING (AUB): Primary | ICD-10-CM

## 2022-09-09 DIAGNOSIS — N83.6 HEMATOSALPINX: ICD-10-CM

## 2022-09-09 DIAGNOSIS — N83.202 HEMORRHAGIC CYST OF LEFT OVARY: ICD-10-CM

## 2022-09-09 PROCEDURE — 99213 OFFICE O/P EST LOW 20 MIN: CPT | Performed by: OBSTETRICS & GYNECOLOGY

## 2022-09-09 NOTE — PATIENT INSTRUCTIONS
Justin por evans confianza en nuestro equipo  Aliza Akash y agradecemos nohelia comentarios  Si recibe norman encuesta nuestra, tómese unos momentos para informarnos cómo estamos     Sinceramente,  [facility], DO

## 2022-09-09 NOTE — PROGRESS NOTES
PROBLEM GYNECOLOGICAL VISIT    John Cunningham is a 49 y o  female who presents for follow up s/p pelvic sonogram for AUB and laboratory studies   Her general medical history has been reviewed and she reports it as follows:    Past Medical History:   Diagnosis Date    H  pylori infection     Hyperlipidemia     Hypertension      Past Surgical History:   Procedure Laterality Date     SECTION      ,     TUBAL LIGATION Bilateral      OB History        2    Para   2    Term   2       0    AB   0    Living   2       SAB   0    IAB   0    Ectopic   0    Multiple   0    Live Births   2               Social History     Tobacco Use    Smoking status: Never Smoker    Smokeless tobacco: Never Used   Vaping Use    Vaping Use: Never used   Substance Use Topics    Alcohol use: Yes     Comment: couple times/year    Drug use: Never     Social History     Substance and Sexual Activity   Sexual Activity Not Currently    Partners: Male    Birth control/protection: Female Sterilization       Current Outpatient Medications   Medication Instructions    amLODIPine (NORVASC) 5 mg, Oral, Daily    atorvastatin (LIPITOR) 10 mg, Oral, Daily    Iron-Vitamin C (Vitron-C)  MG TABS Take one tablet daily    medroxyPROGESTERone (PROVERA) 10 mg, Oral, Daily    pantoprazole (PROTONIX) 20 mg, Oral, Daily       History of Present Illness:   Patient presents for follow up s/p pelvic sonogram for h/o AUB  Patient stated that her last menses was heavy and she bled for more than 21 days which has never happened before  Review of Systems:  Review of Systems   Genitourinary: Positive for menstrual problem  Heavy AUB   All other systems reviewed and are negative  Physical Exam:  /83   Pulse 79   Ht 5' 4" (1 626 m)   Wt 76 4 kg (168 lb 6 4 oz)   LMP 2022 (Approximate)   BMI 28 91 kg/m²   Physical Exam  Constitutional:       Appearance: Normal appearance     Neurological: Mental Status: She is alert  Discussion:  Reviewed pelvic sonogram with patient  Discuss with patient sonogram consist of normal endometrial stripe with left hemorrhagic cyst and left hematosalpinx with normal blood work  Discuss with patient the AUB is because she is perimenopausal which causes irregularity in her menses  Recommend if she should have another episode of AUB to return to the office  Assessment:   1  Perimenopausal AUB   2  Left hematosalpinx   3  Left hemorrhagic cyst    Plan:   1  No intervention at this time   2  Patient to return if she has any episodes of AUB   3  Return to office prn  Review[facility] with patient [facility], but that they will not necessarily be reviewed by me immediately  Explained that I will review results at my earliest opportunity and contact patient appropriately

## 2022-09-28 ENCOUNTER — OFFICE VISIT (OUTPATIENT)
Dept: FAMILY MEDICINE CLINIC | Facility: CLINIC | Age: 47
End: 2022-09-28

## 2022-09-28 VITALS
TEMPERATURE: 96.8 F | RESPIRATION RATE: 18 BRPM | SYSTOLIC BLOOD PRESSURE: 130 MMHG | BODY MASS INDEX: 29.02 KG/M2 | WEIGHT: 170 LBS | HEIGHT: 64 IN | OXYGEN SATURATION: 98 % | HEART RATE: 86 BPM | DIASTOLIC BLOOD PRESSURE: 88 MMHG

## 2022-09-28 DIAGNOSIS — E66.3 OVERWEIGHT: ICD-10-CM

## 2022-09-28 DIAGNOSIS — E78.5 HYPERLIPIDEMIA, UNSPECIFIED HYPERLIPIDEMIA TYPE: ICD-10-CM

## 2022-09-28 DIAGNOSIS — Z00.00 ANNUAL PHYSICAL EXAM: ICD-10-CM

## 2022-09-28 DIAGNOSIS — Z23 ENCOUNTER FOR IMMUNIZATION: ICD-10-CM

## 2022-09-28 DIAGNOSIS — Z76.89 ENCOUNTER TO ESTABLISH CARE: Primary | ICD-10-CM

## 2022-09-28 DIAGNOSIS — Z01.20 DENTAL EXAMINATION: ICD-10-CM

## 2022-09-28 DIAGNOSIS — K59.00 CONSTIPATION, UNSPECIFIED CONSTIPATION TYPE: ICD-10-CM

## 2022-09-28 DIAGNOSIS — I10 PRIMARY HYPERTENSION: ICD-10-CM

## 2022-09-28 DIAGNOSIS — K21.9 GASTROESOPHAGEAL REFLUX DISEASE WITHOUT ESOPHAGITIS: ICD-10-CM

## 2022-09-28 PROCEDURE — 90471 IMMUNIZATION ADMIN: CPT

## 2022-09-28 PROCEDURE — 90682 RIV4 VACC RECOMBINANT DNA IM: CPT

## 2022-09-28 PROCEDURE — 99204 OFFICE O/P NEW MOD 45 MIN: CPT

## 2022-09-28 RX ORDER — ATORVASTATIN CALCIUM 10 MG/1
10 TABLET, FILM COATED ORAL DAILY
Qty: 30 TABLET | Refills: 2 | Status: SHIPPED | OUTPATIENT
Start: 2022-09-28

## 2022-09-28 RX ORDER — AMLODIPINE BESYLATE 5 MG/1
5 TABLET ORAL DAILY
Qty: 30 TABLET | Refills: 2 | Status: SHIPPED | OUTPATIENT
Start: 2022-09-28

## 2022-09-28 NOTE — PROGRESS NOTES
[address] [facility]    NAME: Karly Blank  AGE: 49 y o  SEX: female  : 1975     DATE: 2022     Assessment and Plan:     Problem List Items Addressed This Visit        Digestive    Gastroesophageal reflux disease without esophagitis     Received treatment for H pylori in past  Now with mild, intermittent heartburn  - Avoid trigger foods, large meals, and lying down within 2 hours after eating    - Continue Protonix 20 mg PRN  Cardiovascular and Mediastinum    Primary hypertension     BP at goal in office today: 130/88  - Continue amlodipine 5 mg daily  - Continue low-salt diet and daily physical activity  Relevant Medications    amLODIPine (NORVASC) 5 mg tablet       Other    Hyperlipidemia     - Continue atorvastatin 10 mg daily   - Lipid panel  Relevant Medications    atorvastatin (LIPITOR) 10 mg tablet    Other Relevant Orders    Ambulatory Referral to Nutrition Services    Constipation     - Lifestyle measures: daily physical activity, increase fiber/fruits/vegetables in diet, adequate hydration  Relevant Orders    Ambulatory Referral to Nutrition Services    Overweight     BMI Counseling: Body mass index is 29 18 kg/m²  The BMI is above normal  Nutrition recommendations include decreasing portion sizes, encouraging healthy choices of fruits and vegetables, limiting drinks that contain sugar and moderation in carbohydrate intake  Exercise recommendations include moderate physical activity 150 minutes/week  Patient referred to nutritionist  Rationale for BMI follow-up plan is due to patient being overweight or obese            Relevant Orders    Lipid panel (Completed)    Hemoglobin A1C (Completed)    Ambulatory Referral to Nutrition Services      Other Visit Diagnoses     Encounter to establish care    -  Primary    Annual physical exam        Encounter for immunization Relevant Orders    influenza vaccine, quadrivalent, recombinant, PF, 0 5 mL, for patients 18 yr+ (FLUBLOK) (Completed)    Dental examination        Relevant Orders    Ambulatory Referral to Dentistry          Immunizations and preventive care screenings were discussed with patient today  Appropriate education was printed on patient's after visit summary  Counseling:  Dental Health: discussed importance of regular tooth brushing, flossing, and dental visits  · Exercise: the importance of regular exercise/physical activity was discussed  Recommend exercise 3-5 times per week for at least 30 minutes  BMI Counseling: Body mass index is 29 18 kg/m²  The BMI is above normal  Nutrition recommendations include decreasing portion sizes, encouraging healthy choices of fruits and vegetables, limiting drinks that contain sugar and moderation in carbohydrate intake  Exercise recommendations include moderate physical activity 150 minutes/week  Patient referred to nutritionist  Rationale for BMI follow-up plan is due to patient being overweight or obese  Depression Screening and Follow-up Plan: Patient was screened for depression during today's encounter  They screened negative with a PHQ-2 score of 0  Return in about 3 months (around 12/28/2022) for Recheck HTN, labs, weight  Chief Complaint:     Chief Complaint   Patient presents with    Physical Exam     48 y/o    Hypertension    Establish Care      History of Present Illness:     Adult Annual Physical   Char Roman presented to the office to establish care and for a comprehensive physical exam  Pt moved to this area from [place] about 1 year ago  Pt's main concern today is obtaining refills for her medications  She has an appt scheduled for her mammogram next month  Colonoscopy was done last November in [facility]  Pt reports chronic occasional chest pain that she states has been evaluated and determined to be muscular or anxiety-related   Occurs more with long hours at work  Denies accompanying symptoms, relieved by naproxen  Diet and Physical Activity  · Diet/Nutrition: limited fruits/vegetables  · Exercise: no formal exercise  Active at work  Depression Screening  PHQ-2/9 Depression Screening    Little interest or pleasure in doing things: 0 - not at all  Feeling down, depressed, or hopeless: 0 - not at all  PHQ-2 Score: 0  PHQ-2 Interpretation: Negative depression screen       General Health  · Sleep: sleeps well  · Hearing: normal - bilateral   · Vision: no vision problems  · Dental: brushes teeth twice daily  /GYN Health  · Patient is: premenopausal  · Contraception: tubal ligation  Review of Systems:     Review of Systems   Constitutional: Negative for fatigue, fever and unexpected weight change  Eyes: Negative for visual disturbance  Respiratory: Positive for chest tightness  Negative for cough, shortness of breath and wheezing  Cardiovascular: Negative for chest pain, palpitations and leg swelling  Gastrointestinal: Positive for abdominal pain (heartburn) and constipation  Negative for blood in stool, diarrhea, nausea and vomiting  Endocrine: Negative for polyuria  Genitourinary: Negative for difficulty urinating  Neurological: Negative for dizziness and headaches  Psychiatric/Behavioral: Negative for dysphoric mood  The patient is nervous/anxious  All other systems reviewed and are negative       Past Medical History:     Past Medical History:   Diagnosis Date    H  pylori infection     Hyperlipidemia     Hypertension       Past Surgical History:     Past Surgical History:   Procedure Laterality Date     SECTION      ,     TUBAL LIGATION Bilateral 2008      Social History:     Social History     Socioeconomic History    Marital status: Legally      Spouse name: None    Number of children: None    Years of education: None    Highest education level: None   Occupational History    None   Tobacco Use    Smoking status: Never Smoker    Smokeless tobacco: Never Used   Vaping Use    Vaping Use: Never used   Substance and Sexual Activity    Alcohol use: Yes     Comment: couple times/year    Drug use: Never    Sexual activity: Not Currently     Partners: Male     Birth control/protection: Female Sterilization   Other Topics Concern    None   Social History Narrative    None     Social Determinants of Health     Financial Resource Strain: Low Risk     Difficulty of Paying Living Expenses: Not hard at all   Food Insecurity: No Food Insecurity    Worried About Running Out of Food in the Last Year: Never true    Chasity of Food in the Last Year: Never true   Transportation Needs: No Transportation Needs    Lack of Transportation (Medical): No    Lack of Transportation (Non-Medical):  No   Physical Activity: Not on file   Stress: Not on file   Social Connections: Not on file   Intimate Partner Violence: Not on file   Housing Stability: Low Risk     Unable to Pay for Housing in the Last Year: No    Number of Places Lived in the Last Year: 1    Unstable Housing in the Last Year: No      Family History:     Family History   Problem Relation Age of Onset    Hypertension Mother     Vascular Disease Mother     Asthma Mother     Hypertension Father     Diabetes Father     Vascular Disease Father     No Known Problems Brother     Colon cancer Maternal Grandmother     Stroke Maternal Grandfather     Stroke Paternal Grandmother     Cancer Paternal Grandfather         throat    Breast cancer Neg Hx     Ovarian cancer Neg Hx       Current Medications:     Current Outpatient Medications   Medication Sig Dispense Refill    amLODIPine (NORVASC) 5 mg tablet Take 1 tablet (5 mg total) by mouth daily 30 tablet 2    atorvastatin (LIPITOR) 10 mg tablet Take 1 tablet (10 mg total) by mouth daily 30 tablet 2    pantoprazole (PROTONIX) 20 mg tablet Take 1 tablet (20 mg total) by mouth daily 20 tablet 0    Iron-Vitamin C (Vitron-C)  MG TABS Take one tablet daily (Patient not taking: Reported on 9/28/2022) 30 tablet 2    medroxyPROGESTERone (PROVERA) 10 mg tablet Take 1 tablet (10 mg total) by mouth daily (Patient not taking: Reported on 9/28/2022) 30 tablet 0     No current facility-administered medications for this visit  Allergies: Allergies   Allergen Reactions    Shellfish-Derived Products - Food Allergy Throat Swelling      Physical Exam:     /88 (BP Location: Left arm, Patient Position: Sitting, Cuff Size: Adult)   Pulse 86   Temp (!) 96 8 °F (36 °C) (Temporal)   Resp 18   Ht 5' 4" (1 626 m)   Wt 77 1 kg (170 lb)   SpO2 98%   BMI 29 18 kg/m²     Physical Exam  Vitals reviewed  Constitutional:       General: She is not in acute distress  Appearance: She is overweight  She is not ill-appearing or diaphoretic  HENT:      Head: Normocephalic and atraumatic  Right Ear: Tympanic membrane, ear canal and external ear normal       Left Ear: Tympanic membrane, ear canal and external ear normal       Nose: Nose normal       Mouth/Throat:      Mouth: Mucous membranes are moist       Pharynx: Oropharynx is clear  Eyes:      General: Lids are normal       Conjunctiva/sclera: Conjunctivae normal       Pupils: Pupils are equal, round, and reactive to light  Neck:      Thyroid: No thyromegaly or thyroid tenderness  Cardiovascular:      Rate and Rhythm: Normal rate and regular rhythm  Pulses: Normal pulses  Heart sounds: Normal heart sounds  No murmur heard  Pulmonary:      Effort: Pulmonary effort is normal  No tachypnea  Breath sounds: Normal breath sounds  No decreased breath sounds or wheezing  Abdominal:      General: Bowel sounds are normal  There is no distension  Palpations: Abdomen is soft  Tenderness: There is no abdominal tenderness  Musculoskeletal:      Right lower leg: No edema  Left lower leg: No edema  Lymphadenopathy:      Cervical: No cervical adenopathy  Skin:     General: Skin is warm and dry  Neurological:      Mental Status: She is alert and oriented to person, place, and time  Motor: Motor function is intact  Gait: Gait is intact     Psychiatric:         Mood and Affect: Mood and affect normal           Ayan Mercer, [id number]

## 2022-10-01 ENCOUNTER — APPOINTMENT (OUTPATIENT)
Dept: LAB | Facility: HOSPITAL | Age: 47
End: 2022-10-01
Payer: COMMERCIAL

## 2022-10-01 DIAGNOSIS — E66.3 OVERWEIGHT: ICD-10-CM

## 2022-10-01 LAB
CHOLEST SERPL-MCNC: 213 MG/DL
EST. AVERAGE GLUCOSE BLD GHB EST-MCNC: 105 MG/DL
HBA1C MFR BLD: 5.3 %
HDLC SERPL-MCNC: 39 MG/DL
LDLC SERPL CALC-MCNC: 149 MG/DL
NONHDLC SERPL-MCNC: 174 MG/DL
TRIGL SERPL-MCNC: 124 MG/DL

## 2022-10-01 PROCEDURE — 80061 LIPID PANEL: CPT

## 2022-10-01 PROCEDURE — 83036 HEMOGLOBIN GLYCOSYLATED A1C: CPT

## 2022-10-01 PROCEDURE — 36415 COLL VENOUS BLD VENIPUNCTURE: CPT

## 2022-10-02 PROBLEM — K21.9 GASTROESOPHAGEAL REFLUX DISEASE WITHOUT ESOPHAGITIS: Status: ACTIVE | Noted: 2022-10-02

## 2022-10-02 PROBLEM — E66.3 OVERWEIGHT: Status: ACTIVE | Noted: 2022-10-02

## 2022-10-02 NOTE — ASSESSMENT & PLAN NOTE
- Lifestyle measures: daily physical activity, increase fiber/fruits/vegetables in diet, adequate hydration

## 2022-10-02 NOTE — ASSESSMENT & PLAN NOTE
Received treatment for H pylori in past  Now with mild, intermittent heartburn  - Avoid trigger foods, large meals, and lying down within 2 hours after eating    - Continue Protonix 20 mg PRN

## 2022-10-02 NOTE — ASSESSMENT & PLAN NOTE
BP at goal in office today: 130/88  - Continue amlodipine 5 mg daily  - Continue low-salt diet and daily physical activity

## 2022-10-02 NOTE — ASSESSMENT & PLAN NOTE
BMI Counseling: Body mass index is 29 18 kg/m²  The BMI is above normal  Nutrition recommendations include decreasing portion sizes, encouraging healthy choices of fruits and vegetables, limiting drinks that contain sugar and moderation in carbohydrate intake  Exercise recommendations include moderate physical activity 150 minutes/week  Patient referred to nutritionist  Rationale for BMI follow-up plan is due to patient being overweight or obese

## 2022-11-02 ENCOUNTER — ANNUAL EXAM (OUTPATIENT)
Dept: OBGYN CLINIC | Facility: CLINIC | Age: 47
End: 2022-11-02

## 2022-11-02 VITALS
WEIGHT: 170.4 LBS | SYSTOLIC BLOOD PRESSURE: 138 MMHG | HEIGHT: 64 IN | DIASTOLIC BLOOD PRESSURE: 89 MMHG | BODY MASS INDEX: 29.09 KG/M2 | HEART RATE: 81 BPM

## 2022-11-02 DIAGNOSIS — Z12.4 CERVICAL CANCER SCREENING: ICD-10-CM

## 2022-11-02 DIAGNOSIS — Z12.11 COLON CANCER SCREENING: ICD-10-CM

## 2022-11-02 DIAGNOSIS — N64.4 BREAST PAIN: ICD-10-CM

## 2022-11-02 DIAGNOSIS — Z12.31 ENCOUNTER FOR SCREENING MAMMOGRAM FOR MALIGNANT NEOPLASM OF BREAST: Primary | ICD-10-CM

## 2022-11-02 NOTE — PROGRESS NOTES
Assessment     Kaelyn Melendez is a 49 y o  [id number] with normal gynecologic exam   Amber Knight [id number] used for this visit  Plan     1  Routine well woman exam done today  2   Pap and HPV: Pap with HPV was done today  Current ASCCP Guidelines reviewed  3   Diagnostic mammogram ordered  Recommend yearly mammography  4   The patient declined STD testing  Safe sex practices have been discussed  5  The patient is sexually active  She declined contraception and options have been discussed  6  The following were reviewed in today's visit: osteoporosis, adequate intake of calcium and vitamin D, exercise, healthy diet and colonoscopy discussed and ordered  7  Patient to return to office in 12 months for annual well woman exam        Lj Magaña is a 49 y o  female who presents for annual well woman exam  Periods are irregular, lasting 5 days  LMP 10/17/2022  GYN:  · No vaginal discharge, labial erythema or lesions, dyspareunia  · Menses are irregular starting in August of this year, lasting 5 days  · Contraception: None  · Patient is sexually active with one partner  · Gynecologic surgery: tubal lgiation    OB:  [id number] female  She had 2 cesarian sections (first one large for gestation, second one pre-eclampsia)    :  · No dysuria, urinary frequency or urgency  · No hematuria, flank pain, incontinence  Breast:  · No breast mass, skin changes, dimpling, reddening, nipple retraction  No breast discharge  · Has been having left sided breast pain for the past 2 weeks- no trauma or other inciting factors  no abnormalities noted on physical exam other than mild tenderness  Diagnostic mammogram ordered today and patient was advised to schedule this as soon as possible        General:  · Diet: healthy  · Exercise: sometimes  · Work: [employer]  · ETOH use: No  · Tobacco use: No  · Recreational drug use: No    Screening:  · Cervical cancer: last pap smear 2 years ago, per patient  Results were negative  · Breast cancer: last mammogram approximately 2 years ago  Results were normal   · Colon cancer: never been screened  Colonoscopy ordered today  · STD screening: declined  Review of Systems  Pertinent items are noted in HPI  Objective      /89   Pulse 81   Ht 5' 4" (1 626 m)   Wt 77 3 kg (170 lb 6 4 oz)   LMP 10/17/2022 (Approximate)   BMI 29 25 kg/m²       Physical Exam  Vitals reviewed  Exam conducted with a chaperone present  Constitutional:       General: She is not in acute distress  Appearance: She is well-developed  She is not diaphoretic  HENT:      Head: Normocephalic and atraumatic  Eyes:      Conjunctiva/sclera: Conjunctivae normal    Cardiovascular:      Rate and Rhythm: Normal rate and regular rhythm  Heart sounds: Normal heart sounds  No murmur heard  No friction rub  No gallop  Pulmonary:      Effort: Pulmonary effort is normal  No respiratory distress  Breath sounds: Normal breath sounds  No wheezing or rales  Chest:      Chest wall: No mass, lacerations, deformity, swelling, tenderness or crepitus  Breasts: Ge Score is 5  Breasts are symmetrical       Right: Normal  No swelling, inverted nipple, mass, nipple discharge, skin change, axillary adenopathy or supraclavicular adenopathy  Left: No swelling, inverted nipple, mass, nipple discharge, skin change, axillary adenopathy or supraclavicular adenopathy  Comments: Mild tenderness noted diffusely on left breast  No other abnormalities noted  Abdominal:      General: Bowel sounds are normal  There is no distension  Palpations: Abdomen is soft  There is no mass  Tenderness: There is no abdominal tenderness  There is no guarding  Genitourinary:     General: Normal vulva  Exam position: Lithotomy position  Pubic Area: No rash or pubic lice  Ge stage (genital): 5        Labia:         Right: No rash, tenderness, lesion or injury  Left: No rash, tenderness, lesion or injury  Vagina: No signs of injury and foreign body  No vaginal discharge, tenderness, bleeding, lesions or prolapsed vaginal walls  Cervix: No cervical motion tenderness, discharge, lesion, erythema or cervical bleeding  Adnexa:         Right: No tenderness  Left: No tenderness  Musculoskeletal:         General: No tenderness or deformity  Normal range of motion  Cervical back: Normal range of motion  Right lower leg: No edema  Left lower leg: No edema  Lymphadenopathy:      Upper Body:      Right upper body: No supraclavicular, axillary or pectoral adenopathy  Left upper body: No supraclavicular, axillary or pectoral adenopathy  Skin:     General: Skin is warm and dry  Neurological:      General: No focal deficit present  Mental Status: She is alert and oriented to person, place, and time                Milton Barnes MD  11/2/2022

## 2022-11-04 LAB
HPV HR 12 DNA CVX QL NAA+PROBE: NEGATIVE
HPV16 DNA CVX QL NAA+PROBE: NEGATIVE
HPV18 DNA CVX QL NAA+PROBE: NEGATIVE

## 2022-11-08 DIAGNOSIS — Z12.39 ENCOUNTER FOR SCREENING FOR MALIGNANT NEOPLASM OF BREAST, UNSPECIFIED SCREENING MODALITY: Primary | ICD-10-CM

## 2022-11-09 LAB
LAB AP GYN PRIMARY INTERPRETATION: NORMAL
Lab: NORMAL

## 2022-11-10 ENCOUNTER — TELEPHONE (OUTPATIENT)
Dept: OBGYN CLINIC | Facility: CLINIC | Age: 47
End: 2022-11-10

## 2022-11-10 DIAGNOSIS — N64.4 BREAST PAIN: Primary | ICD-10-CM

## 2022-11-10 DIAGNOSIS — Z12.39 ENCOUNTER FOR SCREENING FOR MALIGNANT NEOPLASM OF BREAST, UNSPECIFIED SCREENING MODALITY: ICD-10-CM

## 2022-11-10 NOTE — TELEPHONE ENCOUNTER
Called PT and left her a voice message in Portuguese notify her that the mammogram was put in the system

## 2022-12-29 ENCOUNTER — HOSPITAL ENCOUNTER (OUTPATIENT)
Dept: MAMMOGRAPHY | Facility: CLINIC | Age: 47
End: 2022-12-29

## 2022-12-29 VITALS — BODY MASS INDEX: 29.02 KG/M2 | HEIGHT: 64 IN | WEIGHT: 170 LBS

## 2022-12-29 DIAGNOSIS — N64.4 BREAST PAIN: ICD-10-CM

## 2023-01-11 ENCOUNTER — OFFICE VISIT (OUTPATIENT)
Dept: FAMILY MEDICINE CLINIC | Facility: CLINIC | Age: 48
End: 2023-01-11

## 2023-01-11 VITALS
OXYGEN SATURATION: 99 % | HEART RATE: 80 BPM | WEIGHT: 176.6 LBS | BODY MASS INDEX: 30.15 KG/M2 | HEIGHT: 64 IN | TEMPERATURE: 96.9 F | SYSTOLIC BLOOD PRESSURE: 124 MMHG | DIASTOLIC BLOOD PRESSURE: 82 MMHG | RESPIRATION RATE: 18 BRPM

## 2023-01-11 DIAGNOSIS — I10 PRIMARY HYPERTENSION: Primary | ICD-10-CM

## 2023-01-11 DIAGNOSIS — R53.83 FATIGUE, UNSPECIFIED TYPE: ICD-10-CM

## 2023-01-11 DIAGNOSIS — E78.5 HYPERLIPIDEMIA, UNSPECIFIED HYPERLIPIDEMIA TYPE: ICD-10-CM

## 2023-01-11 DIAGNOSIS — G47.00 INSOMNIA, UNSPECIFIED TYPE: ICD-10-CM

## 2023-01-11 DIAGNOSIS — K59.00 CONSTIPATION, UNSPECIFIED CONSTIPATION TYPE: ICD-10-CM

## 2023-01-11 DIAGNOSIS — H54.7 DECREASED VISUAL ACUITY: ICD-10-CM

## 2023-01-11 DIAGNOSIS — K21.9 GASTROESOPHAGEAL REFLUX DISEASE WITHOUT ESOPHAGITIS: ICD-10-CM

## 2023-01-11 RX ORDER — ATORVASTATIN CALCIUM 10 MG/1
10 TABLET, FILM COATED ORAL DAILY
Qty: 90 TABLET | Refills: 1 | Status: SHIPPED | OUTPATIENT
Start: 2023-01-11

## 2023-01-11 RX ORDER — HYDROXYZINE HYDROCHLORIDE 25 MG/1
25 TABLET, FILM COATED ORAL
Qty: 30 TABLET | Refills: 2 | Status: SHIPPED | OUTPATIENT
Start: 2023-01-11

## 2023-01-11 RX ORDER — AMLODIPINE BESYLATE 5 MG/1
5 TABLET ORAL DAILY
Qty: 90 TABLET | Refills: 1 | Status: SHIPPED | OUTPATIENT
Start: 2023-01-11

## 2023-01-11 RX ORDER — POLYETHYLENE GLYCOL 3350 17 G/17G
17 POWDER, FOR SOLUTION ORAL DAILY
Qty: 578 G | Refills: 1 | Status: SHIPPED | OUTPATIENT
Start: 2023-01-11

## 2023-01-11 NOTE — ASSESSMENT & PLAN NOTE
Continues to have intermittent heartburn symptoms, triggered by acidic foods or milk  Taking pantoprazole as needed     - Avoid trigger foods, large meals, and lying down within 2 hours after eating    - Continue pantoprazole PRN, daily if needed

## 2023-01-11 NOTE — PROGRESS NOTES
Name: Ivett Vang      : 1975      MRN: [id number]  Encounter Provider: HAYDER Cedeño  Encounter Date: 2023   Encounter department: [address] [place] [address]     1  Primary hypertension  Assessment & Plan:  BP at goal in office today: 124/82   - Continue amlodipine 5 mg daily  - Continue low-salt diet and daily physical activity  Orders:  -     amLODIPine (NORVASC) 5 mg tablet; Take 1 tablet (5 mg total) by mouth daily    2  Hyperlipidemia, unspecified hyperlipidemia type  Assessment & Plan:  Lab Results   Component Value Date    CHOLESTEROL 213 (H) 10/01/2022     Lab Results   Component Value Date    HDL 39 (L) 10/01/2022     Lab Results   Component Value Date    TRIG 124 10/01/2022     Lab Results   Component Value Date    [place] 174 10/01/2022     Lab Results   Component Value Date    LDLCALC 149 (H) 10/01/2022     ASCVD risk 2 3%  - Continue atorvastatin 10 mg daily   - Healthy diet and daily physical activity  Orders:  -     atorvastatin (LIPITOR) 10 mg tablet; Take 1 tablet (10 mg total) by mouth daily    3  Gastroesophageal reflux disease without esophagitis  Assessment & Plan:  Continues to have intermittent heartburn symptoms, triggered by acidic foods or milk  Taking pantoprazole as needed  - Avoid trigger foods, large meals, and lying down within 2 hours after eating    - Continue pantoprazole PRN, daily if needed      4  Constipation, unspecified constipation type  Assessment & Plan:  BMs q 3-4 days  Limited fruits and vegetables in diet  - Ensure adequate hydration, increase fiber intake  - Miralax 1 capful in 8 oz fluid daily then PRN  Orders:  -     polyethylene glycol (GLYCOLAX) 17 GM/SCOOP powder; Take 17 g by mouth daily    5  Insomnia, unspecified type  Assessment & Plan:  - Hydroxyzine 25 mg HS PRN  Orders:  -     hydrOXYzine HCL (ATARAX) 25 mg tablet;  Take 1 tablet (25 mg total) by mouth daily at bedtime as needed (insomnia/sleep)    6  Decreased visual acuity  Assessment & Plan:  Worsening near vision  Overdue for eye exam   - Referral to Ophthalmology Dr Nicolasa Menjivar  Orders:  -     Ambulatory Referral to Ophthalmology; Future    7  Fatigue, unspecified type  Assessment & Plan:  - Treat insomnia  - CBC/diff, iron panel, CMP, vitamin D    Orders:  -     CBC and differential; Future  -     Comprehensive metabolic panel; Future  -     Vitamin D 25 hydroxy; Future  -     Iron Panel (Includes Ferritin, Iron Sat%, Iron, and TIBC); Future         Subjective     HPI     Croatian language interpretation services were utilized for this visit  Maddy Croft presented to the office for routine follow up for chronic conditions  Pt reports no concerns, no recent chest pain  Review of Systems   Constitutional: Positive for fatigue  Negative for chills, fever and unexpected weight change  Eyes: Positive for visual disturbance (decreased visual acuity)  Respiratory: Negative for cough, chest tightness, shortness of breath and wheezing  Cardiovascular: Negative for chest pain, palpitations and leg swelling  Gastrointestinal: Positive for abdominal pain (heartburn) and constipation  Negative for blood in stool, diarrhea, nausea and vomiting  Endocrine: Negative for polyuria  Genitourinary: Negative for difficulty urinating and dysuria  Neurological: Negative for dizziness and headaches  Psychiatric/Behavioral: Positive for sleep disturbance  Negative for dysphoric mood  The patient is not nervous/anxious  All other systems reviewed and are negative        Past Medical History:   Diagnosis Date   • H  pylori infection    • Hyperlipidemia    • Hypertension      Past Surgical History:   Procedure Laterality Date   •  SECTION      ,    • TUBAL LIGATION Bilateral      Family History   Problem Relation Age of Onset   • Hypertension Mother    • Vascular Disease Mother    • Asthma Mother    • Hypertension Father    • Diabetes Father    • Vascular Disease Father    • No Known Problems Brother    • Colon cancer Maternal Grandmother    • Stroke Maternal Grandfather    • Stroke Paternal Grandmother    • Cancer Paternal Grandfather         throat   • Breast cancer Neg Hx    • Ovarian cancer Neg Hx      Social History     Socioeconomic History   • Marital status: Legally      Spouse name: None   • Number of children: None   • Years of education: None   • Highest education level: None   Occupational History   • None   Tobacco Use   • Smoking status: Never   • Smokeless tobacco: Never   Vaping Use   • Vaping Use: Never used   Substance and Sexual Activity   • Alcohol use: Yes     Comment: couple times/year   • Drug use: Never   • Sexual activity: Not Currently     Partners: Male     Birth control/protection: Female Sterilization   Other Topics Concern   • None   Social History Narrative   • None     Social Determinants of Health     Financial Resource Strain: Low Risk    • Difficulty of Paying Living Expenses: Not hard at all   Food Insecurity: No Food Insecurity   • Worried About Running Out of Food in the Last Year: Never true   • Ran Out of Food in the Last Year: Never true   Transportation Needs: No Transportation Needs   • Lack of Transportation (Medical): No   • Lack of Transportation (Non-Medical):  No   Physical Activity: Not on file   Stress: Not on file   Social Connections: Not on file   Intimate Partner Violence: Not on file   Housing Stability: Low Risk    • Unable to Pay for Housing in the Last Year: No   • Number of Places Lived in the Last Year: 1   • Unstable Housing in the Last Year: No     Current Outpatient Medications on File Prior to Visit   Medication Sig   • pantoprazole (PROTONIX) 20 mg tablet Take 1 tablet (20 mg total) by mouth daily (Patient not taking: Reported on 11/2/2022)     Allergies   Allergen Reactions   • Shellfish-Derived Products - Food Allergy Throat Swelling     Immunization History   Administered Date(s) Administered   • COVID-19 PFIZER VACCINE 0 3 ML IM 05/27/2021, 06/17/2021   • Influenza, recombinant, quadrivalent,injectable, preservative free 09/28/2022       Objective     /82 (BP Location: Left arm, Patient Position: Sitting, Cuff Size: Standard)   Pulse 80   Temp (!) 96 9 °F (36 1 °C) (Temporal)   Resp 18   Ht 5' 4" (1 626 m)   Wt 80 1 kg (176 lb 9 6 oz)   SpO2 99%   BMI 30 31 kg/m²     Physical Exam  Vitals reviewed  Constitutional:       General: She is not in acute distress  Appearance: She is obese  She is not ill-appearing or diaphoretic  HENT:      Head: Normocephalic and atraumatic  Cardiovascular:      Rate and Rhythm: Normal rate and regular rhythm  Heart sounds: Normal heart sounds  No murmur heard  Pulmonary:      Effort: Pulmonary effort is normal  No tachypnea  Breath sounds: Normal breath sounds  No decreased breath sounds or wheezing  Abdominal:      General: Bowel sounds are normal  There is no distension  Palpations: Abdomen is soft  Tenderness: There is no abdominal tenderness  There is no guarding  Musculoskeletal:      Right lower leg: No edema  Left lower leg: No edema  Skin:     General: Skin is warm and dry  Neurological:      Mental Status: She is alert and oriented to person, place, and time     Psychiatric:         Mood and Affect: Mood and affect normal        HAYDER Galarza

## 2023-01-15 PROBLEM — R53.83 FATIGUE: Status: ACTIVE | Noted: 2023-01-15

## 2023-01-15 PROBLEM — G47.00 INSOMNIA: Status: ACTIVE | Noted: 2023-01-15

## 2023-01-15 PROBLEM — H54.7 DECREASED VISUAL ACUITY: Status: ACTIVE | Noted: 2023-01-15

## 2023-01-16 NOTE — ASSESSMENT & PLAN NOTE
Lab Results   Component Value Date    CHOLESTEROL 213 (H) 10/01/2022     Lab Results   Component Value Date    HDL 39 (L) 10/01/2022     Lab Results   Component Value Date    TRIG 124 10/01/2022     Lab Results   Component Value Date    [place] 174 10/01/2022     Lab Results   Component Value Date    LDLCALC 149 (H) 10/01/2022     ASCVD risk 2 3%  - Continue atorvastatin 10 mg daily   - Healthy diet and daily physical activity

## 2023-01-16 NOTE — ASSESSMENT & PLAN NOTE
BMs q 3-4 days  Limited fruits and vegetables in diet  - Ensure adequate hydration, increase fiber intake  - Miralax 1 capful in 8 oz fluid daily then PRN

## 2023-01-16 NOTE — ASSESSMENT & PLAN NOTE
BP at goal in office today: 124/82   - Continue amlodipine 5 mg daily  - Continue low-salt diet and daily physical activity

## 2023-12-01 DIAGNOSIS — I10 PRIMARY HYPERTENSION: ICD-10-CM

## 2023-12-01 NOTE — TELEPHONE ENCOUNTER
12/01/23    Pt came into the office today requesting refill on the following med:   amLODIPine (NORVASC) 5 mg tablet     Pt was are that she needed an appt (01/11/22 last visit)     Pt agreed to schedule    Pt next appt 12/13/23      Any questions, please contact Pt

## 2023-12-03 RX ORDER — AMLODIPINE BESYLATE 5 MG/1
5 TABLET ORAL DAILY
Qty: 30 TABLET | Refills: 0 | Status: SHIPPED | OUTPATIENT
Start: 2023-12-03

## 2023-12-13 ENCOUNTER — OFFICE VISIT (OUTPATIENT)
Dept: FAMILY MEDICINE CLINIC | Facility: CLINIC | Age: 48
End: 2023-12-13

## 2023-12-13 VITALS
SYSTOLIC BLOOD PRESSURE: 146 MMHG | BODY MASS INDEX: 32.1 KG/M2 | RESPIRATION RATE: 18 BRPM | HEIGHT: 64 IN | DIASTOLIC BLOOD PRESSURE: 102 MMHG | WEIGHT: 188 LBS | TEMPERATURE: 97.6 F | OXYGEN SATURATION: 99 % | HEART RATE: 73 BPM

## 2023-12-13 DIAGNOSIS — Z23 ENCOUNTER FOR IMMUNIZATION: ICD-10-CM

## 2023-12-13 DIAGNOSIS — E66.09 CLASS 1 OBESITY DUE TO EXCESS CALORIES WITH SERIOUS COMORBIDITY AND BODY MASS INDEX (BMI) OF 32.0 TO 32.9 IN ADULT: ICD-10-CM

## 2023-12-13 DIAGNOSIS — M54.42 ACUTE MIDLINE LOW BACK PAIN WITH LEFT-SIDED SCIATICA: ICD-10-CM

## 2023-12-13 DIAGNOSIS — I10 PRIMARY HYPERTENSION: Primary | ICD-10-CM

## 2023-12-13 DIAGNOSIS — K21.9 GASTROESOPHAGEAL REFLUX DISEASE WITHOUT ESOPHAGITIS: ICD-10-CM

## 2023-12-13 DIAGNOSIS — E78.5 HYPERLIPIDEMIA, UNSPECIFIED HYPERLIPIDEMIA TYPE: ICD-10-CM

## 2023-12-13 DIAGNOSIS — R53.83 FATIGUE, UNSPECIFIED TYPE: ICD-10-CM

## 2023-12-13 DIAGNOSIS — Z11.59 NEED FOR HEPATITIS C SCREENING TEST: ICD-10-CM

## 2023-12-13 DIAGNOSIS — I83.812 VARICOSE VEINS OF LEFT LOWER EXTREMITY WITH PAIN: ICD-10-CM

## 2023-12-13 DIAGNOSIS — G47.00 INSOMNIA, UNSPECIFIED TYPE: ICD-10-CM

## 2023-12-13 DIAGNOSIS — Z11.4 SCREENING FOR HIV (HUMAN IMMUNODEFICIENCY VIRUS): ICD-10-CM

## 2023-12-13 PROCEDURE — 90686 IIV4 VACC NO PRSV 0.5 ML IM: CPT

## 2023-12-13 PROCEDURE — 99214 OFFICE O/P EST MOD 30 MIN: CPT

## 2023-12-13 PROCEDURE — 90471 IMMUNIZATION ADMIN: CPT

## 2023-12-13 RX ORDER — NAPROXEN 500 MG/1
500 TABLET ORAL 2 TIMES DAILY PRN
Qty: 60 TABLET | Refills: 1 | Status: SHIPPED | OUTPATIENT
Start: 2023-12-13

## 2023-12-13 RX ORDER — AMLODIPINE BESYLATE 5 MG/1
5 TABLET ORAL DAILY
Qty: 90 TABLET | Refills: 1 | Status: SHIPPED | OUTPATIENT
Start: 2023-12-13

## 2023-12-13 RX ORDER — ATORVASTATIN CALCIUM 10 MG/1
10 TABLET, FILM COATED ORAL DAILY
Qty: 90 TABLET | Refills: 1 | Status: SHIPPED | OUTPATIENT
Start: 2023-12-13

## 2023-12-13 RX ORDER — HYDROXYZINE HYDROCHLORIDE 25 MG/1
25 TABLET, FILM COATED ORAL
Qty: 30 TABLET | Refills: 2 | Status: SHIPPED | OUTPATIENT
Start: 2023-12-13

## 2023-12-13 RX ORDER — PANTOPRAZOLE SODIUM 20 MG/1
20 TABLET, DELAYED RELEASE ORAL DAILY
Qty: 30 TABLET | Refills: 2 | Status: SHIPPED | OUTPATIENT
Start: 2023-12-13

## 2023-12-13 NOTE — PATIENT INSTRUCTIONS
"Usted puede llamar Dr. Landaverde ([phone]) o [facility] ([phone]) para norman robert de vision.     Plan de alimentación con \"enfoque dietético para detener la hipertensión” (DASH, por nohelia siglas en inglés)   LO QUE NECESITA SABER:   ¿Qué es el plan de alimentación con enfoque dietético para detener la hipertensión (DASH, por nohelia siglas en inglés)? El plan de alimentación DASH está diseñado para ayudar a prevenir o disminuir la hipertensión. También puede ayudar a bajar el colesterol lake (colesterol LDL) y disminuir nieto riesgo de enfermedad cardíaca. El plan es bajo en sodio, azúcar, grasas dañinas, y grasas en nieto totalidad. Es alto en potasio, calcio, magnesio y fibra. Estos nutrientes se agregan al consumir más frutas, vegetales y granos enteros. Con el plan de alimentación DASH, usted necesita consumir un número específico de porciones de cada donaldo de alimentos. [place] le ayudará a consumir las cantidades suficientes de ciertos nutrientes y limitar otros. La cantidad de porciones que usted debe comer depende de la cantidad de calorías que usted necesita. Nieto dietista puede ayudarlo a crear planes de comidas con la cantidad adecuada de porciones para cada donaldo de alimentos.       ¿Qué necesito saber acerca del sodio? Nieto dietista le indicará la cantidad de sodio que usted debe consumir a diario. La gente que tiene la presión arterial elysia debe consumir de 1,500 a 2,300 mg de sodio al día erin shannon. Norman cucharadita (cdta) de sal tiene 2,300 mg de sodio. [place] puede parecer erin norman meta difícil, yury pequeños cambios en los alimentos que usted consume pueden hacer norman gran diferencia. Nieto médico o dietista puede ayudarlo a crear un plan alimenticio que cumpla nieto límite de sodio.  Meaghan las etiquetas de los alimentos. Las etiquetas pueden ayudarle a escoger alimentos bajos en sodio. La cantidad de sodio está incluida en miligramos (mg). La columna del porcentaje de valor diario indica la cantidad de necesidades " diarias satisfechas con 1 porción del alimento para cada nutriente en la lista. Escoja alimentos que tengan menos de 5% del porcentaje diario de sodio. Estos alimentos se consideran bajos en sodio. Los alimentos que tienen 20% o más del porcentaje diario de sodio se consideran alimentos altos en sodio. Evite alimentos que tengan más de 300 mg de sodio por porción. Escoja alimentos cuya etiqueta diga que son bajos en sodio, con sodio reducido, o sin sal agregada.         Limite la sal agregada. No sale la comida en la reveles si se añade sal al cocinar. Use hierbas y condimentos, erin cebollas, ajo y especias sin sal para agregar sabor. Use jugo de lima, gilmar o vinagre para agregar un sabor ácido. Use chiles picantes o norman cantidad pequeña de salsa picante para agregar un sabor picante. Limite los alimentos con alto contenido de sal agregada, erin los siguientes:    Condimentos hechos con sal, erin sal de ajo, sal de apio, sal de cebolla, sal condimentada, suavizantes para tristen, y glutamato de monosodio (MSG, por nohelia siglas en inglés)    Sopa Miso y mezclas para sopa enlatadas o secas    Salsa de soya regular, salsa de barbacoa, salsa teriyaki, salsa para bistec, salsa [place], y la mayoría de las vinagres con sabor    Alimentos para merendar, erin ericka tostadas, palomitas de maíz, pretzels, piel de cerdo, galletas de soda saladas, y nueces saladas    Alimentos congelados, erin cenas, entradas, vegetales en salsa, y tristen empanizadas    Pregunte sobre los sustitutos para la sal. Pregúntele a evans médico si es posible usar sustitutos de la sal. Algunos sustitutos de la sal vienen con ingredientes que pueden ser dañinos si usted tiene ciertos padecimientos médicos.    Escoja los alimentos cuidadosamente cuando sale a comer a restaurantes: las comidas de los restaurantes, sobre todo restaurantes de comida rápida, ryland siempre son altas en sodio. Algunos restaurantes ofrecen información nutricional que indica la  cantidad de sodio en nohelia alimentos. Pida que preparen nohelia comidas con menos sal o sin sal.    ¿Qué necesito saber acerca de las grasas? Las grasas insaturadas y los ácidos grasos omega-3 son ejemplos de grasas saludables. Las grasas no saludables incluyen las grasas saturadas y las grasas trans.  Incluya grasas saludables, erin las siguientes:     Aceites de cocina, erin el de soja, canola, lomas o girasol    Pescados grasos, erin el salmón, el atún, la caballa o las ramana    Aceite de linaza o linaza molida    ½ taza de frijoles cocidos, erin frijoles negros, frijoles rojos o frijoles pintos    1½ onzas de john secos bajos en sodio, erin almendras o nueces    [place] de maní baja en azúcar y sodio    Semillas, erin las de chía o girasol       Limite o no consuma grasas poco saludables, erin los siguientes:     Alimentos que contienen grasa de origen animal, erin las tristen grasas, la leche entera, la mantequilla y la crema    [place], margarina en rubin, aceite de bro y aceite de kylee.    Aderezo de ensalada completo o cremoso    Sopa cremosa    Galletas, ericka fritas y productos de panadería elaborados con margarina o manteca    Alimentos fritos con grasas poco saludables    Salsa de carne y salsas, erin la [place] o la de queso    ¿Qué necesito saber acerca de los carbohidratos? Todos los carbohidratos se descomponen en azúcar. Los carbohidratos complejos contienen más fibra que los simples. [place] significa que los carbohidratos complejos entran en el torrente sanguíneo más lentamente y causan menos picos de azúcar en la grisel. Intenta incluir más carbohidratos complejos y menos carbohidratos simples.  Incluya carbohidratos complejos, erin los siguientes:     1 tajada de pan integral    1 onza de cereal seco que no contenga azúcar añadida    ½ taza de cristobal cocida    2 onzas de pasta integral cocida    ½ taza de arroz integral cocido    Limite o no consuma carbohidratos simples, erin los siguientes:      Productos horneados, erin rosquillas, pasteles y galletas    Mezclas para pan de maíz y galletas    Arroz whitt y mezclas de pasta, erin los macarrones con queso de caja    Cereales instantáneos y fríos que contienen azúcar    Jalea, mermelada y helado que contienen azúcar    Condimentos, erin el ketchup    Bebidas con alto contenido en azúcar, erin refrescos, limonadas y jugos de frutas    ¿Qué necesito saber acerca de las verduras y frutas? Las verduras y las frutas pueden ser frescas, congeladas o enlatadas. Si es posible, trate de elegir opciones enlatadas bajas en sodio.  Incluya norman variedad de verduras y frutas, erin las siguientes:     1 manzana, ken o melocotón medianos (aproximadamente ½ taza picada)    ½ banana pequeña    ½ taza de bayas, erin arándanos, fresas o moras    1 taza de verduras de hoja kalin crudas, erin jyoti, espinacas, col rizada o berza    ½ taza de verduras congeladas o enlatadas (sin sal agregada), erin judías verdes    ½ taza de fruta fresca, congelada o enlatada (enlatada en sirope liviano o jugo de fruta)    ½ taza de jugo de verduras o frutas    Limite o no consuma verduras y frutas elaboradas de las siguientes maneras:     Fruta congelada, erin las cerezas, con azúcar añadida    Frutas en crema o salsa de mantequilla    Las verduras enlatadas son altas en sodio.    Sauerkraut, vegetales en escabeche, y otros alimentos preparados con vinagre    Vegetales fritos o vegetales en mantequilla o salsas altas en grasas    ¿Qué necesito saber acerca de los alimentos con proteína?  Incluya alimentos proteicos magros o bajos en grasa, erin los siguientes:     Chele papi (aida paiz) sin piel    Pescado (sobre todo pescado con grasa, erin salmón, atún fresco o caballa)    Carne de res o de cerdo magra (lomo, carne molida extra magra)    Claras de huevo y sustitutos del huevo    1 taza de leche descremada o leche 1%    1½ onzas de queso descremado o bajo en grasas    6 onzas de  yogurt descremado o bajo en grasas    Limite o no consuma alimentos con alto contenido de proteínas, erin los siguientes     Chele ahumadas o curadas, erin carne preparada con maíz, tocineta, jamón, perros calientes, y salchichas    Frijoles enlatados y chele enlatadas o en pasta, erin chele en conserva, ramana, anchoas y mariscos de imitación    Chele para emparedado, erin mortadela, jamón, pavo, y carne en rebanada    Chele altas en grasas (biste estilo T-bone, carne molida para hamburguesas, costillas)    Huevos enteros y yemas de huevo    Leche entera, leche al 2% y crema    Queso normal y queso fundido    ¿Qué otras pautas ugy seguir?  Mantenga un peso saludable. [place] riesgo de enfermedad cardíaca es aún más alto si tiene sobrepeso. Pregúntele a evans médico cuál es el peso ideal para usted. Evans médico puede sugerirle que pierda peso. Usted puede perder peso si se propone consumir menos calorías y alimentos que tengan azúcar y grasas agregadas. El plan de alimentación DASH puede ayudarle a lograrlo. Marsha buena forma de disminuir el consumo de calorías es consumiendo porciones más pequeñas en cada comida y menos meriendas entre comidas. Consulte a evans médico para obtener más información sobre cómo adelgazar.    Realice actividad física con regularidad. El ejercicio regular puede ayudarle a alcanzar o mantener un peso saludable. El ejercicio regular también puede ayudarle a disminuir evans presión arterial y mejorar nohelia niveles de colesterol. Jenna ejercicios moderados por 30 minutos o más todos los días de la semana. Para bajar peso, asegúrese de ejercitarse por lo menos 60 minutos. Consulte con evans médico sobre un programa de ejercicio adecuado para usted.         Limite el consumo de alcohol. Las mujeres deberían limitar el consumo de alcohol a 1 bebida por día. Los hombres deberían limitar el consumo de alcohol a 2 tragos al día. Un trago equivale a 12 onzas de cerveza, 5 onzas de vino o 1 onza y ½ de  licor.    ¿Dónde puedo obtener más información?  [facility] [place]  [facility]  [address] [address]  [place] [address]  Phone: [phone]  Web Address: http://www.nhlbi.nih.gov/health/infoctr/index.htm    ACUERDOS SOBRE EVANS CUIDADO:   Usted tiene el derecho de ayudar a planear evans cuidado. Discuta nohelia opciones de tratamiento con nohelia médicos para decidir el cuidado que usted desea recibir. Usted siempre tiene el derecho de rechazar el tratamiento.Esta información es sólo para uso en educación. Evans intención no es darle un consejo médico sobre enfermedades o tratamientos. Colsulte con evans médico, enfermera o farmacéutico antes de seguir cualquier régimen médico para saber si es seguro y efectivo para usted.  © Copyright [employer] 2023 Information is for End User's use only and may not be sold, redistributed or otherwise used for commercial purposes.

## 2023-12-13 NOTE — PROGRESS NOTES
Name: Mira Evans      : 1975      MRN: [id number]  Encounter Provider: HAYDER Grey  Encounter Date: 2023   Encounter department: [facility]    Assessment & Plan     1. Primary hypertension  Assessment & Plan:  BP above goal in office today: 146/102. Pt out of BP medication, also with poor sleep and pain.  - Restart amlodipine 5 mg daily  - Continue low-salt diet and daily physical activity.   - Complete labs and return for recheck in 4 weeks.      Orders:  -     CBC and differential; Future  -     Comprehensive metabolic panel; Future  -     Albumin / creatinine urine ratio; Future  -     amLODIPine (NORVASC) 5 mg tablet; Take 1 tablet (5 mg total) by mouth daily    2. Class 1 obesity due to excess calories with serious comorbidity and body mass index (BMI) of 32.0 to 32.9 in adult  Assessment & Plan:  Body mass index is 32.27 kg/m². The BMI is above normal.   - Nutrition recommendations include decreasing portion sizes, encouraging healthy choices of fruits and vegetables, limiting drinks that contain sugar and moderation in carbohydrate intake.   - Exercise recommendations include moderate physical activity 150 minutes/week.      3. Hyperlipidemia, unspecified hyperlipidemia type  Assessment & Plan:  - Lipid panel.  - Continue/restart atorvastatin 10 mg daily.    Orders:  -     Lipid panel; Future  -     atorvastatin (LIPITOR) 10 mg tablet; Take 1 tablet (10 mg total) by mouth daily    4. Acute midline low back pain with left-sided sciatica  Assessment & Plan:  Ongoing x2 months.  - Naproxen 500 mg BID PRN.  - Referred to PT.    Orders:  -     naproxen (Naprosyn) 500 mg tablet; Take 1 tablet (500 mg total) by mouth 2 (two) times a day as needed for moderate pain  -     Ambulatory Referral to Physical Therapy; Future    5. Gastroesophageal reflux disease without esophagitis  -     pantoprazole (PROTONIX) 20 mg tablet; Take 1 tablet (20 mg total) by  mouth daily    6. Varicose veins of left lower extremity with pain  Assessment & Plan:  - Avoid prolonged standing if possible.  - Wear compression stockings daily, especially at work.  - Referred to Vascular Surgery as requested.    Orders:  -     Ambulatory Referral to Vascular Surgery; Future    7. Insomnia, unspecified type  Assessment & Plan:  - Restart hydroxyzine 25 mg HS PRN.    Orders:  -     hydrOXYzine HCL (ATARAX) 25 mg tablet; Take 1 tablet (25 mg total) by mouth daily at bedtime as needed (insomnia/sleep)    8. Fatigue, unspecified type  Assessment & Plan:  Possibly multifactorial related to pain and lack of restful sleep.   - Labs.   - Treat insomnia and pain.    Orders:  -     CBC and differential; Future  -     TSH + Free T4; Future  -     Vitamin D 25 hydroxy; Future    9. Need for hepatitis C screening test  -     Hepatitis C Antibody; Future    10. Screening for HIV (human immunodeficiency virus)  -     HIV 1/2 AG/AB w Reflex [facility] for 2 yr old and above; Future    11. Encounter for immunization  -     influenza vaccine, quadrivalent, 0.5 mL, preservative-free, for adult and pediatric patients 6 mos+ (AFLURIA, FLUARIX, FLULAVAL, FLUZONE)      BMI Counseling: Body mass index is 32.27 kg/m². The BMI is above normal. Nutrition recommendations include decreasing portion sizes, encouraging healthy choices of fruits and vegetables, limiting drinks that contain sugar and moderation in carbohydrate intake. Exercise recommendations include moderate physical activity 150 minutes/week. Rationale for BMI follow-up plan is due to patient being overweight or obese.     Depression Screening and Follow-up Plan: Patient was screened for depression during today's encounter. They screened negative with a PHQ-2 score of 2.        [facility]    [place] language interpretation services were utilized for this visit.  Mira presents to the office today for routine follow up for chronic conditions. Chronic  conditions are stable unless otherwise noted. She has been out of her blood pressure medication for a few days. She reports feeling dizzy, weak, and having a headache. She has not been sleeping well, getting 5-6 hours per night.   Pt has been having back pain x2 months that radiates into her left buttock/thigh. Also reporting painful varicosities in left leg for which pt used to follow with a vascular specialist in [place]. States compression stockings are not helping.       Review of Systems   Constitutional:  Positive for fatigue. Negative for fever and unexpected weight change.   Eyes:  Negative for visual disturbance.   Respiratory:  Negative for cough, shortness of breath and wheezing.    Cardiovascular:  Negative for chest pain, palpitations and leg swelling.   Gastrointestinal:  Negative for abdominal pain, diarrhea, nausea and vomiting.   Musculoskeletal:  Positive for back pain and myalgias.   Neurological:  Positive for dizziness, weakness and headaches. Negative for light-headedness.   Psychiatric/Behavioral:  Positive for sleep disturbance. Negative for dysphoric mood. The patient is not nervous/anxious.    All other systems reviewed and are negative.      Past Medical History:   Diagnosis Date    H. pylori infection     Hyperlipidemia     Hypertension      Past Surgical History:   Procedure Laterality Date     SECTION      ,     TUBAL LIGATION Bilateral 2008     Family History   Problem Relation Age of Onset    Hypertension Mother     Vascular Disease Mother     Asthma Mother     Hypertension Father     Diabetes Father     Vascular Disease Father     No Known Problems Brother     Colon cancer Maternal Grandmother     Stroke Maternal Grandfather     Stroke Paternal Grandmother     Cancer Paternal Grandfather         throat    Breast cancer Neg Hx     Ovarian cancer Neg Hx      Social History     Socioeconomic History    Marital status: Legally      Spouse name: None    Number of  children: None    Years of education: None    Highest education level: None   Occupational History    None   Tobacco Use    Smoking status: Never    Smokeless tobacco: Never   Vaping Use    Vaping status: Never Used   Substance and Sexual Activity    Alcohol use: Yes     Comment: couple times/year    Drug use: Never    Sexual activity: Not Currently     Partners: Male     Birth control/protection: Female Sterilization   Other Topics Concern    None   Social History Narrative    None     Social Determinants of Health     Financial Resource Strain: Low Risk  (12/13/2023)    Overall Financial Resource Strain (CARDIA)     Difficulty of Paying Living Expenses: Not hard at all   Food Insecurity: No Food Insecurity (12/13/2023)    Hunger Vital Sign     Worried About Running Out of Food in the Last Year: Never true     Ran Out of Food in the Last Year: Never true   Transportation Needs: No Transportation Needs (12/13/2023)    PRAPARE - Transportation     Lack of Transportation (Medical): No     Lack of Transportation (Non-Medical): No   Physical Activity: Not on file   Stress: Not on file   Social Connections: Not on file   Intimate Partner Violence: Not on file   Housing Stability: Low Risk  (8/22/2022)    Housing Stability Vital Sign     Unable to Pay for Housing in the Last Year: No     Number of Places Lived in the Last Year: 1     Unstable Housing in the Last Year: No     Current Outpatient Medications on File Prior to Visit   Medication Sig    polyethylene glycol (GLYCOLAX) 17 GM/SCOOP powder Take 17 g by mouth daily     Allergies   Allergen Reactions    Shellfish-Derived Products - Food Allergy Throat Swelling     Immunization History   Administered Date(s) Administered    COVID-19 PFIZER VACCINE 0.3 ML IM 05/27/2021, 06/17/2021    Influenza, injectable, quadrivalent, preservative free 0.5 mL 12/13/2023    Influenza, recombinant, quadrivalent,injectable, preservative free 09/28/2022       Objective     BP (!) 146/102  "(BP Location: Left arm, Patient Position: Sitting, Cuff Size: Standard)   Pulse 73   Temp 97.6 °F (36.4 °C) (Temporal)   Resp 18   Ht 5' 4\" (1.626 m)   Wt 85.3 kg (188 lb)   SpO2 99%   BMI 32.27 kg/m²     Physical Exam  Vitals reviewed.   Constitutional:       General: She is not in acute distress.     Appearance: She is obese. She is not ill-appearing or diaphoretic.   HENT:      Head: Normocephalic and atraumatic.      Mouth/Throat:      Mouth: Mucous membranes are moist.   Eyes:      General: Lids are normal.      Conjunctiva/sclera: Conjunctivae normal.      Pupils: Pupils are equal, round, and reactive to light.   Cardiovascular:      Rate and Rhythm: Normal rate and regular rhythm.      Heart sounds: Normal heart sounds. No murmur heard.  Pulmonary:      Effort: Pulmonary effort is normal. No tachypnea.      Breath sounds: Normal breath sounds. No decreased breath sounds or wheezing.   Musculoskeletal:      Cervical back: Normal.      Thoracic back: Normal.      Lumbar back: Tenderness present. No swelling or deformity. Positive left straight leg raise test. Negative right straight leg raise test.      Right lower leg: No edema.      Left lower leg: No edema.   Skin:     General: Skin is warm and dry.   Neurological:      Mental Status: She is alert and oriented to person, place, and time.      Cranial Nerves: No cranial nerve deficit.      Motor: Motor function is intact. No weakness.      Gait: Gait is intact.   Psychiatric:         Attention and Perception: Attention normal.         Mood and Affect: Mood and affect normal.         Behavior: Behavior normal.         Thought Content: Thought content does not include homicidal or suicidal ideation.       HAYDER Grey    "

## 2023-12-23 ENCOUNTER — APPOINTMENT (OUTPATIENT)
Dept: LAB | Facility: HOSPITAL | Age: 48
End: 2023-12-23
Payer: COMMERCIAL

## 2023-12-23 DIAGNOSIS — Z11.59 NEED FOR HEPATITIS C SCREENING TEST: ICD-10-CM

## 2023-12-23 DIAGNOSIS — E78.5 HYPERLIPIDEMIA, UNSPECIFIED HYPERLIPIDEMIA TYPE: ICD-10-CM

## 2023-12-23 DIAGNOSIS — Z11.4 SCREENING FOR HIV (HUMAN IMMUNODEFICIENCY VIRUS): ICD-10-CM

## 2023-12-23 DIAGNOSIS — I10 PRIMARY HYPERTENSION: ICD-10-CM

## 2023-12-23 DIAGNOSIS — R53.83 FATIGUE, UNSPECIFIED TYPE: ICD-10-CM

## 2023-12-23 LAB
25(OH)D3 SERPL-MCNC: 14.9 NG/ML (ref 30–100)
ALBUMIN SERPL BCP-MCNC: 4.2 G/DL (ref 3.5–5)
ALP SERPL-CCNC: 54 U/L (ref 34–104)
ALT SERPL W P-5'-P-CCNC: 11 U/L (ref 7–52)
ANION GAP SERPL CALCULATED.3IONS-SCNC: 6 MMOL/L
AST SERPL W P-5'-P-CCNC: 14 U/L (ref 13–39)
BASOPHILS # BLD AUTO: 0.03 THOUSANDS/ÂΜL (ref 0–0.1)
BASOPHILS NFR BLD AUTO: 1 % (ref 0–1)
BILIRUB SERPL-MCNC: 0.6 MG/DL (ref 0.2–1)
BUN SERPL-MCNC: 13 MG/DL (ref 5–25)
CALCIUM SERPL-MCNC: 8.7 MG/DL (ref 8.4–10.2)
CHLORIDE SERPL-SCNC: 103 MMOL/L (ref 96–108)
CHOLEST SERPL-MCNC: 210 MG/DL
CO2 SERPL-SCNC: 26 MMOL/L (ref 21–32)
CREAT SERPL-MCNC: 0.64 MG/DL (ref 0.6–1.3)
CREAT UR-MCNC: 97.2 MG/DL
EOSINOPHIL # BLD AUTO: 0.14 THOUSAND/ÂΜL (ref 0–0.61)
EOSINOPHIL NFR BLD AUTO: 2 % (ref 0–6)
ERYTHROCYTE [DISTWIDTH] IN BLOOD BY AUTOMATED COUNT: 12.3 % (ref 11.6–15.1)
GFR SERPL CREATININE-BSD FRML MDRD: 105 ML/MIN/1.73SQ M
GLUCOSE P FAST SERPL-MCNC: 86 MG/DL (ref 65–99)
HCT VFR BLD AUTO: 40 % (ref 34.8–46.1)
HCV AB SER QL: NORMAL
HDLC SERPL-MCNC: 52 MG/DL
HGB BLD-MCNC: 13.3 G/DL (ref 11.5–15.4)
HIV 1+2 AB+HIV1 P24 AG SERPL QL IA: NORMAL
HIV 2 AB SERPL QL IA: NORMAL
HIV1 AB SERPL QL IA: NORMAL
HIV1 P24 AG SERPL QL IA: NORMAL
IMM GRANULOCYTES # BLD AUTO: 0.02 THOUSAND/UL (ref 0–0.2)
IMM GRANULOCYTES NFR BLD AUTO: 0 % (ref 0–2)
LDLC SERPL CALC-MCNC: 135 MG/DL (ref 0–100)
LYMPHOCYTES # BLD AUTO: 2.37 THOUSANDS/ÂΜL (ref 0.6–4.47)
LYMPHOCYTES NFR BLD AUTO: 39 % (ref 14–44)
MCH RBC QN AUTO: 31.3 PG (ref 26.8–34.3)
MCHC RBC AUTO-ENTMCNC: 33.3 G/DL (ref 31.4–37.4)
MCV RBC AUTO: 94 FL (ref 82–98)
MICROALBUMIN UR-MCNC: 8.6 MG/L
MICROALBUMIN/CREAT 24H UR: 9 MG/G CREATININE (ref 0–30)
MONOCYTES # BLD AUTO: 0.67 THOUSAND/ÂΜL (ref 0.17–1.22)
MONOCYTES NFR BLD AUTO: 11 % (ref 4–12)
NEUTROPHILS # BLD AUTO: 2.82 THOUSANDS/ÂΜL (ref 1.85–7.62)
NEUTS SEG NFR BLD AUTO: 47 % (ref 43–75)
NONHDLC SERPL-MCNC: 158 MG/DL
NRBC BLD AUTO-RTO: 0 /100 WBCS
PLATELET # BLD AUTO: 296 THOUSANDS/UL (ref 149–390)
PMV BLD AUTO: 10.2 FL (ref 8.9–12.7)
POTASSIUM SERPL-SCNC: 4 MMOL/L (ref 3.5–5.3)
PROT SERPL-MCNC: 7 G/DL (ref 6.4–8.4)
RBC # BLD AUTO: 4.25 MILLION/UL (ref 3.81–5.12)
SODIUM SERPL-SCNC: 135 MMOL/L (ref 135–147)
T4 FREE SERPL-MCNC: 0.8 NG/DL (ref 0.61–1.12)
TRIGL SERPL-MCNC: 116 MG/DL
TSH SERPL DL<=0.05 MIU/L-ACNC: 1.08 UIU/ML (ref 0.45–4.5)
WBC # BLD AUTO: 6.05 THOUSAND/UL (ref 4.31–10.16)

## 2023-12-23 PROCEDURE — 82570 ASSAY OF URINE CREATININE: CPT

## 2023-12-23 PROCEDURE — 87389 HIV-1 AG W/HIV-1&-2 AB AG IA: CPT

## 2023-12-23 PROCEDURE — 80061 LIPID PANEL: CPT

## 2023-12-23 PROCEDURE — 85025 COMPLETE CBC W/AUTO DIFF WBC: CPT

## 2023-12-23 PROCEDURE — 86803 HEPATITIS C AB TEST: CPT

## 2023-12-23 PROCEDURE — 82306 VITAMIN D 25 HYDROXY: CPT

## 2023-12-23 PROCEDURE — 80053 COMPREHEN METABOLIC PANEL: CPT

## 2023-12-23 PROCEDURE — 84439 ASSAY OF FREE THYROXINE: CPT

## 2023-12-23 PROCEDURE — 36415 COLL VENOUS BLD VENIPUNCTURE: CPT

## 2023-12-23 PROCEDURE — 84443 ASSAY THYROID STIM HORMONE: CPT

## 2023-12-23 PROCEDURE — 82043 UR ALBUMIN QUANTITATIVE: CPT

## 2023-12-24 DIAGNOSIS — E55.9 VITAMIN D DEFICIENCY: Primary | ICD-10-CM

## 2023-12-24 PROBLEM — E66.811 CLASS 1 OBESITY DUE TO EXCESS CALORIES WITH SERIOUS COMORBIDITY AND BODY MASS INDEX (BMI) OF 32.0 TO 32.9 IN ADULT: Status: ACTIVE | Noted: 2023-12-24

## 2023-12-24 PROBLEM — I83.812 VARICOSE VEINS OF LEFT LOWER EXTREMITY WITH PAIN: Status: ACTIVE | Noted: 2023-12-24

## 2023-12-24 PROBLEM — M54.42 ACUTE MIDLINE LOW BACK PAIN WITH LEFT-SIDED SCIATICA: Status: ACTIVE | Noted: 2023-12-24

## 2023-12-24 PROBLEM — E66.09 CLASS 1 OBESITY DUE TO EXCESS CALORIES WITH SERIOUS COMORBIDITY AND BODY MASS INDEX (BMI) OF 32.0 TO 32.9 IN ADULT: Status: ACTIVE | Noted: 2023-12-24

## 2023-12-24 RX ORDER — CHOLECALCIFEROL (VITAMIN D3) 50 MCG
2000 TABLET ORAL DAILY
Qty: 90 TABLET | Refills: 3 | Status: SHIPPED | OUTPATIENT
Start: 2023-12-24

## 2023-12-24 NOTE — ASSESSMENT & PLAN NOTE
- Avoid prolonged standing if possible.  - Wear compression stockings daily, especially at work.  - Referred to Vascular Surgery as requested.

## 2023-12-24 NOTE — ASSESSMENT & PLAN NOTE
Body mass index is 32.27 kg/m². The BMI is above normal.   - Nutrition recommendations include decreasing portion sizes, encouraging healthy choices of fruits and vegetables, limiting drinks that contain sugar and moderation in carbohydrate intake.   - Exercise recommendations include moderate physical activity 150 minutes/week.

## 2023-12-24 NOTE — ASSESSMENT & PLAN NOTE
Possibly multifactorial related to pain and lack of restful sleep.   - Labs.   - Treat insomnia and pain.

## 2023-12-24 NOTE — ASSESSMENT & PLAN NOTE
BP above goal in office today: 146/102. Pt out of BP medication, also with poor sleep and pain.  - Restart amlodipine 5 mg daily  - Continue low-salt diet and daily physical activity.   - Complete labs and return for recheck in 4 weeks.

## 2024-05-13 ENCOUNTER — OFFICE VISIT (OUTPATIENT)
Dept: VASCULAR SURGERY | Facility: CLINIC | Age: 49
End: 2024-05-13
Payer: COMMERCIAL

## 2024-05-13 VITALS
SYSTOLIC BLOOD PRESSURE: 120 MMHG | DIASTOLIC BLOOD PRESSURE: 80 MMHG | WEIGHT: 168.6 LBS | BODY MASS INDEX: 28.79 KG/M2 | HEIGHT: 64 IN | HEART RATE: 80 BPM | OXYGEN SATURATION: 99 %

## 2024-05-13 DIAGNOSIS — I83.812 VARICOSE VEINS OF LEFT LOWER EXTREMITY WITH PAIN: Primary | ICD-10-CM

## 2024-05-13 PROCEDURE — 99204 OFFICE O/P NEW MOD 45 MIN: CPT | Performed by: NURSE PRACTITIONER

## 2024-05-13 NOTE — PROGRESS NOTES
48-year-old female with hx of HTN, GERD, insomnia, HLD, Obesity w/ BMI 28.94 and varicose veins with pain is a new consult.    Assessment/Plan:    Varicose veins of left lower extremity with pain  48 year-old presents with symptomatic varicose veins.    -Bulging truncal varicosities with spider telangiectasia of the lower legs with swelling and discomfort L>R. See clinical photos.  -Denies use of compression in the past.  -No hx of vein surgeries.  -Reviewed pathophysiology of venous insuffiencey and varicose veins and treatment with conservative measures at this time with leg compression, elevation and exercise. Pt verbalized understanding and is agreeable to this plan.     Recommendations   -Return to the office in 3 months for f/u with compression.   -Start wearing compression. RX given today with education on how to use.  -Leg elevation. Goal of 3-4 times a day 15 minutes at a time.  -Increase exercise and ambulation.Goal of 3-4 times a week for 30 min.  -Apply moisturizing cream to the b/l legs to maintain skin integrity and prevent breakdown.  -Call the office with any new or worsening symptoms.        Diagnoses and all orders for this visit:    Varicose veins of left lower extremity with pain  -     Ambulatory Referral to Vascular Surgery  -     Compression Stocking          Subjective:      Patient ID: Mira Evans is a 48 y.o. female.    Patient presents today as a new patient for VV. Patient c/o painful, bulging veins of B/L LE. Patient also c/o warmth in the veins. Patient states it gets worse as the day does on because she works on her feet. Patient does not wear compression. Patient takes Atorvastatin. Patient has never smoked.     48-year-old female with hx of HTN, GERD, insomnia, HLD, Obesity and varicose veins with pain is a new consult.    B/L leg pain L>R for several months that has been worsening in the last few weeks. C/O warm, burning sensation that is worse with standing.   She used to  "wear compression but has not worn them in a long time, denies leg elevation.  She does a lot of standing for work 8-10 hours.           The following portions of the patient's history were reviewed and updated as appropriate: allergies, current medications, past family history, past medical history, past social history, past surgical history, and problem list.    Review of Systems   Constitutional: Negative.    HENT: Negative.     Eyes: Negative.    Respiratory: Negative.  Negative for shortness of breath.    Cardiovascular:  Positive for leg swelling. Negative for chest pain.   Gastrointestinal: Negative.    Endocrine: Negative.    Genitourinary: Negative.    Musculoskeletal: Negative.    Skin: Negative.    Allergic/Immunologic: Positive for food allergies (shellfish/seafood).   Neurological: Negative.    Hematological: Negative.    Psychiatric/Behavioral: Negative.           Objective:      /80 (BP Location: Left arm, Patient Position: Sitting, Cuff Size: Adult)   Pulse 80   Ht 5' 4\" (1.626 m)   Wt 76.5 kg (168 lb 9.6 oz)   SpO2 99%   BMI 28.94 kg/m²          Physical Exam  Vitals and nursing note reviewed.   Constitutional:       General: She is not in acute distress.     Appearance: Normal appearance. She is obese. She is not ill-appearing.   HENT:      Head: Normocephalic and atraumatic.   Cardiovascular:      Rate and Rhythm: Normal rate and regular rhythm.      Pulses: Normal pulses.           Radial pulses are 2+ on the right side and 2+ on the left side.        Dorsalis pedis pulses are 2+ on the right side and 2+ on the left side.        Posterior tibial pulses are 2+ on the right side and 2+ on the left side.      Heart sounds: Normal heart sounds. No murmur heard.  Pulmonary:      Effort: Respiratory distress present.      Breath sounds: Normal breath sounds.   Musculoskeletal:         General: No tenderness.      Right lower leg: Right lower leg edema: trace.      Left lower leg: Left lower " "leg edema: trace.   Skin:     General: Skin is warm and dry.      Capillary Refill: Capillary refill takes less than 2 seconds.      Findings: No bruising, erythema, lesion or rash.   Neurological:      General: No focal deficit present.      Mental Status: She is alert and oriented to person, place, and time.      Sensory: Sensory deficit present.      Motor: No weakness.      Gait: Gait normal.   Psychiatric:         Mood and Affect: Mood normal.         Behavior: Behavior normal.         I have reviewed and made appropriate changes to the review of systems input by the medical assistant.    Vitals:    24 1436   BP: 120/80   BP Location: Left arm   Patient Position: Sitting   Cuff Size: Adult   Pulse: 80   SpO2: 99%   Weight: 76.5 kg (168 lb 9.6 oz)   Height: 5' 4\" (1.626 m)       Patient Active Problem List   Diagnosis    Primary hypertension    Hyperlipidemia    Constipation    Overweight    Gastroesophageal reflux disease without esophagitis    Insomnia    Decreased visual acuity    Fatigue    Class 1 obesity due to excess calories with serious comorbidity and body mass index (BMI) of 32.0 to 32.9 in adult    Varicose veins of left lower extremity with pain    Acute midline low back pain with left-sided sciatica    Vitamin D deficiency       Past Surgical History:   Procedure Laterality Date     SECTION      ,     TUBAL LIGATION Bilateral 2008       Family History   Problem Relation Age of Onset    Hypertension Mother     Vascular Disease Mother     Asthma Mother     Hypertension Father     Diabetes Father     Vascular Disease Father     No Known Problems Brother     Colon cancer Maternal Grandmother     Stroke Maternal Grandfather     Stroke Paternal Grandmother     Cancer Paternal Grandfather         throat    Breast cancer Neg Hx     Ovarian cancer Neg Hx        Social History     Socioeconomic History    Marital status: Legally      Spouse name: Not on file    Number of " children: Not on file    Years of education: Not on file    Highest education level: Not on file   Occupational History    Not on file   Tobacco Use    Smoking status: Never    Smokeless tobacco: Never   Vaping Use    Vaping status: Never Used   Substance and Sexual Activity    Alcohol use: Yes     Comment: couple times/year    Drug use: Never    Sexual activity: Not Currently     Partners: Male     Birth control/protection: Female Sterilization   Other Topics Concern    Not on file   Social History Narrative    Not on file     Social Determinants of Health     Financial Resource Strain: Low Risk  (12/13/2023)    Overall Financial Resource Strain (CARDIA)     Difficulty of Paying Living Expenses: Not hard at all   Food Insecurity: No Food Insecurity (12/13/2023)    Hunger Vital Sign     Worried About Running Out of Food in the Last Year: Never true     Ran Out of Food in the Last Year: Never true   Transportation Needs: No Transportation Needs (12/13/2023)    PRAPARE - Transportation     Lack of Transportation (Medical): No     Lack of Transportation (Non-Medical): No   Physical Activity: Not on file   Stress: Not on file   Social Connections: Not on file   Intimate Partner Violence: Not on file   Housing Stability: Low Risk  (8/22/2022)    Housing Stability Vital Sign     Unable to Pay for Housing in the Last Year: No     Number of Places Lived in the Last Year: 1     Unstable Housing in the Last Year: No       Allergies   Allergen Reactions    Shellfish-Derived Products - Food Allergy Throat Swelling         Current Outpatient Medications:     amLODIPine (NORVASC) 5 mg tablet, Take 1 tablet (5 mg total) by mouth daily, Disp: 90 tablet, Rfl: 1    atorvastatin (LIPITOR) 10 mg tablet, Take 1 tablet (10 mg total) by mouth daily, Disp: 90 tablet, Rfl: 1    Cholecalciferol (Vitamin D) 50 MCG (2000 UT) tablet, Take 1 tablet (2,000 Units total) by mouth daily, Disp: 90 tablet, Rfl: 3    hydrOXYzine HCL (ATARAX) 25 mg  tablet, Take 1 tablet (25 mg total) by mouth daily at bedtime as needed (insomnia/sleep), Disp: 30 tablet, Rfl: 2    naproxen (Naprosyn) 500 mg tablet, Take 1 tablet (500 mg total) by mouth 2 (two) times a day as needed for moderate pain, Disp: 60 tablet, Rfl: 1    pantoprazole (PROTONIX) 20 mg tablet, Take 1 tablet (20 mg total) by mouth daily, Disp: 30 tablet, Rfl: 2    polyethylene glycol (GLYCOLAX) 17 GM/SCOOP powder, Take 17 g by mouth daily, Disp: 578 g, Rfl: 1  I have spent a total time of 25 minutes on 05/13/24 in caring for this patient including Risks and benefits of tx options, Instructions for management, Patient and family education, Importance of tx compliance, Counseling / Coordination of care, Documenting in the medical record, Reviewing / ordering tests, medicine, procedures  , and Obtaining or reviewing history  .

## 2024-05-13 NOTE — LETTER
May 13, 2024     Patient: Mira Evans  YOB: 1975  Date of Visit: 5/13/2024      To Whom it May Concern:    Mira Evans is under my professional care. Mira was seen in my office on 5/13/2024, please excuse her from work today. Mira may return to work on 5/13/24 .    If you have any questions or concerns, please don't hesitate to call.         Sincerely,          HAYDER Sahni        CC: No Recipients

## 2024-05-13 NOTE — ASSESSMENT & PLAN NOTE
48 year-old presents with symptomatic varicose veins.    -Bulging truncal varicosities with spider telangiectasia of the lower legs with swelling and discomfort L>R. See clinical photos.  -Denies use of compression in the past.  -No hx of vein surgeries.  -Reviewed pathophysiology of venous insufficiency and varicose veins and treatment with conservative measures at this time with leg compression, elevation and exercise. Pt verbalized understanding and is agreeable to this plan.     Recommendations   -Return to the office in 3 months for f/u with compression.   -Start wearing compression. RX given today with education on how to use.  -Leg elevation. Goal of 3-4 times a day 15 minutes at a time.  -Increase exercise and ambulation.Goal of 3-4 times a week for 30 min.  -Apply moisturizing cream to the b/l legs to maintain skin integrity and prevent breakdown.  -Call the office with any new or worsening symptoms.

## 2024-05-13 NOTE — PATIENT INSTRUCTIONS
"- Call the office if you experience any changes to your legs or feet such as new pain, redness or swelling.    - Stay active. Exercise everyday. Walking is the recommended exercise with a goal of 30 min 3-4 times a week. A healthy weight can assist in decreasing varicose vein symptoms.     - Wear Compression. Put them on in the morning, wear all day and take off before bed at night.     -Elevate your legs above the level of your heart. Elevate for 15 minutes 3-4 times a day.     -When looking at buying compression, look for \"gradient compression\" with a weight 20-30mmHg (medium weight), knee high is fine.  -Try \"Zero9\"    -A good brand is Sigvaris, soft opaque, knee high.    "

## 2024-05-15 ENCOUNTER — ANNUAL EXAM (OUTPATIENT)
Dept: OBGYN CLINIC | Facility: CLINIC | Age: 49
End: 2024-05-15

## 2024-05-15 VITALS
HEIGHT: 64 IN | DIASTOLIC BLOOD PRESSURE: 85 MMHG | HEART RATE: 71 BPM | BODY MASS INDEX: 28.89 KG/M2 | SYSTOLIC BLOOD PRESSURE: 135 MMHG | WEIGHT: 169.2 LBS

## 2024-05-15 DIAGNOSIS — N64.4 BREAST PAIN, LEFT: ICD-10-CM

## 2024-05-15 DIAGNOSIS — Z01.419 ENCOUNTER FOR ANNUAL ROUTINE GYNECOLOGICAL EXAMINATION: Primary | ICD-10-CM

## 2024-05-15 PROCEDURE — 99396 PREV VISIT EST AGE 40-64: CPT | Performed by: NURSE PRACTITIONER

## 2024-05-15 NOTE — PROGRESS NOTES
Annual Exam    Assessment   1. Encounter for annual routine gynecological examination        2. Encounter for screening mammogram for malignant neoplasm of breast  Mammo screening bilateral w 3d & cad        well woman       Plan       All questions answered.  Breast self exam technique reviewed and patient encouraged to perform self-exam monthly.  Contraception: tubal ligation.  Discussed healthy lifestyle modifications.  Follow up in 1 year.  Mammogram.     Patient Instructions   Schedule mammogram  Call with needs or concern  Continue Family Practice follow up  Annual GYN exam in 1 year  Pt verbalized understanding of all discussed.      Subjective      Mira Evans is a 48 y.o.  female who presents for annual well woman exam. Periods are regular every 28-30 days, lasting 5 days. No intermenstrual bleeding, spotting, or discharge.  2022 last WNL PAP and negative HPV  2022 WNL mammogram  1 partner x 2 years, denies domestic violence  Pt states she had colonoscopy 2 years ago in [place], pt states she was having inflamation and provided medication  Pt C/O intermittent L breast pain    Depression Screening Follow-up Plan: Patient's depression screening was negative   with a PHQ-2 score of 2. Their PHQ-9 score was 5. Clinically patient does not have depression. No treatment is required.    BMI Counseling: Body mass index is 29.04 kg/m². The BMI is above normal. Nutrition recommendations include reducing portion sizes, decreasing overall calorie intake, 3-5 servings of fruits/vegetables daily, consuming healthier snacks, moderation in carbohydrate intake, increasing intake of lean protein, and reducing intake of saturated fat and trans fat. Exercise recommendations include moderate aerobic physical activity for 150 minutes/week.        Current contraception: tubal ligation  History of abnormal Pap smear: no  Family history of uterine or ovarian cancer: no  Regular self breast exam:  "yes  History of abnormal mammogram: no  Family history of breast cancer: no  History of abnormal lipids: unknown  Menstrual History:  OB History          2    Para   2    Term   2       0    AB   0    Living   2         SAB   0    IAB   0    Ectopic   0    Multiple   0    Live Births   2                Menarche age: 11  Patient's last menstrual period was 2024 (approximate).       The following portions of the patient's history were reviewed and updated as appropriate: allergies, current medications, past family history, past medical history, past social history, past surgical history and problem list.    Review of Systems  Pertinent items are noted in HPI.      Objective      /85 (BP Location: Left arm, Patient Position: Sitting, Cuff Size: Standard)   Pulse 71   Ht 5' 4\" (1.626 m)   Wt 76.7 kg (169 lb 3.2 oz)   LMP 2024 (Approximate)   BMI 29.04 kg/m²     General: alert and oriented, in no acute distress, alert, appears stated age, and cooperative   Heart: NSR   Lungs: clear to auscultation bilaterally, WNL respiratory effort, negative cough or SOB   Thyroid: Negative masses palpable   Abdomen: soft, non-tender, without masses or organomegaly palpable   Vulva: normal   Vagina: normal mucosa   Cervix: no cervical motion tenderness and no lesions   Uterus: normal size, non-tender, normal shape and consistency   Adnexa: normal adnexa   Urethra: normal   Breasts: NT,negative masses palpable, negative discharge, or dimpling             "

## 2024-05-15 NOTE — PATIENT INSTRUCTIONS
Schedule mammogram  Call with needs or concern  Continue Family Practice follow up  Annual GYN exam in 1 year

## 2024-05-18 PROBLEM — N64.4 BREAST PAIN, LEFT: Status: ACTIVE | Noted: 2024-05-18

## 2024-05-22 ENCOUNTER — TELEPHONE (OUTPATIENT)
Dept: VASCULAR SURGERY | Facility: CLINIC | Age: 49
End: 2024-05-22

## 2024-05-22 NOTE — TELEPHONE ENCOUNTER
Patient called, she speaks broken english , but she wanted to know where to get the stockings .  I referred her to [facility] or [facility], but I will ask the office to mail her our list of providors for stockings so she can call the location closest to her.

## 2024-06-14 ENCOUNTER — OFFICE VISIT (OUTPATIENT)
Dept: FAMILY MEDICINE CLINIC | Facility: CLINIC | Age: 49
End: 2024-06-14

## 2024-06-14 VITALS
DIASTOLIC BLOOD PRESSURE: 84 MMHG | WEIGHT: 169 LBS | TEMPERATURE: 98 F | SYSTOLIC BLOOD PRESSURE: 124 MMHG | HEART RATE: 73 BPM | OXYGEN SATURATION: 95 % | BODY MASS INDEX: 28.85 KG/M2 | RESPIRATION RATE: 16 BRPM | HEIGHT: 64 IN

## 2024-06-14 DIAGNOSIS — G89.29 CHRONIC MIDLINE LOW BACK PAIN WITH BILATERAL SCIATICA: Primary | ICD-10-CM

## 2024-06-14 DIAGNOSIS — G89.29 CHRONIC LEFT SHOULDER PAIN: ICD-10-CM

## 2024-06-14 DIAGNOSIS — M54.42 CHRONIC MIDLINE LOW BACK PAIN WITH BILATERAL SCIATICA: Primary | ICD-10-CM

## 2024-06-14 DIAGNOSIS — M25.512 CHRONIC LEFT SHOULDER PAIN: ICD-10-CM

## 2024-06-14 DIAGNOSIS — I10 PRIMARY HYPERTENSION: ICD-10-CM

## 2024-06-14 DIAGNOSIS — M54.41 CHRONIC MIDLINE LOW BACK PAIN WITH BILATERAL SCIATICA: Primary | ICD-10-CM

## 2024-06-14 PROCEDURE — 99214 OFFICE O/P EST MOD 30 MIN: CPT | Performed by: NURSE PRACTITIONER

## 2024-06-14 RX ORDER — AMLODIPINE BESYLATE 5 MG/1
5 TABLET ORAL DAILY
Qty: 90 TABLET | Refills: 0 | Status: SHIPPED | OUTPATIENT
Start: 2024-06-14

## 2024-06-14 RX ORDER — METHYLPREDNISOLONE 4 MG/1
TABLET ORAL
Qty: 21 EACH | Refills: 0 | Status: SHIPPED | OUTPATIENT
Start: 2024-06-14

## 2024-06-14 RX ORDER — METHOCARBAMOL 500 MG/1
500 TABLET, FILM COATED ORAL 4 TIMES DAILY
Qty: 40 TABLET | Refills: 0 | Status: SHIPPED | OUTPATIENT
Start: 2024-06-14

## 2024-06-14 RX ORDER — NAPROXEN 500 MG/1
500 TABLET ORAL 2 TIMES DAILY PRN
Qty: 60 TABLET | Refills: 1 | Status: SHIPPED | OUTPATIENT
Start: 2024-06-14

## 2024-06-14 NOTE — PATIENT INSTRUCTIONS
Ejercicios para la espalda baja   CUIDADO AMBULATORIO:   Los ejercicios para la espalda baja ayudan a sanar y a fortalecer los músculos de evans espalda para evitar otra lesión. Pregúntele a evans médico si usted necesita acudir con un fisioterapeuta para que le indique ejercicios más avanzado.  Busque atención médica de inmediato si:  Usted tiene dolor severo que le impide moverse.      Llame a evans médico si:  Evans dolor empeora.    Usted tiene un dolor nuevo.    Usted tiene preguntas o inquietudes acerca de evans condición o cuidado.    Realice los ejercicios para la espalda baja de manera freire:  Emiliano los ejercicios sobre norman colchoneta o superficie firme (no sobre norman cama). Norman superficie firme sostendrá evans columna y evitará el dolor lumbar.    Muévase lenta y suavemente. Evite movimientos rápidos o bruscos.    Respire normalmente. No contenga la respiración.    Deténgase si siente dolor. Es normal sentir alguna molestia al principio, yury no debería sentir dolor. Practicar los ejercicios con regularidad ayudará a disminuir evans incomodidad con el paso del tiempo.    Ejercicios para la espalda baja: Evans médico podría recomendarle que realice ejercicios para la espalda de 10 a 30 minutos cada día. También podría recomendarle que emiliano ejercicios de 1 a 3 veces cada día. Pregunte a evans médico cuáles ejercicios son los mejores para usted y con qué frecuencia hacerlos.  Bombeo del tobillo: Acuéstese boca arriba. Levante evans pie (con nohelia dedos apuntando hacia evans samuel). Luego, baje evans pie (con los dedos apuntando lejos de usted). Repita mabel ejercicio 10 veces en cada lado.         Deslizamiento de talón: Acuéstese boca arriba. Muy despacio doble norman pierna y luego enderécela. Luego, doble la otra pierna y enderécela. Repita 10 veces en cada lado.         Inclinación pélvica: Acuéstese boca arriba con nohelia rodillas dobladas y nohelia pies planos sobre el piso. Coloque nohelia brazos en norman posición relajada junto a evans cuerpo. Contraiga los  músculos de evans abdomen y aplane evans espalda contra el piso. Sostenga está posición por 5 segundos. Repita 5 veces.         Estiramiento de la espalda: Acuéstese boca arriba con nohelia peter detrás de evans samuel. Doble nohelia rodillas y gire la mitad de evans cuerpo hacia un lado. Mantenga esta posición por 10 segundos. Repita 3 veces en cada lado.         Levantamiento de la pierna estirada: Acuéstese boca arriba con norman pierna estirada. Doble la otra rodilla. Contraiga evans abdomen y luego levante lentamente la pierna estirada entre 6 a 12 pulgadas del piso. Mantenga esta posición por 1 a 5 segundos. Baje evans pierna lentamente. Repita 10 veces en cada pierna.         Rodillas al pecho: Acuéstese boca arriba con nohelia rodillas dobladas y nohelia pies planos sobre el piso. Traiga norman de las rodillas hacia evans pecho y sosténgala por 5 segundos. Regrese evans pierna a la posición inicial. Levante la otra rodilla hacia el pecho y sosténgala por 5 segundos. Jenna esto 5 veces en cada lado.         Posición anthony camello: Coloque nohelia peter y rodillas sobre el piso. Arquee evans espalda hacia arriba, hacia el techo y baje evans samuel. Arquee evans pete dorsal lo más posible. Sostenga está posición por 5 segundos. Levante evans samuel hacia arriba y baje evans pecho hacia el piso. Sostenga está posición por 5 segundos. Jenna 3 series o erin se le indique.         Posición de cuclillas contra la pared: Párese con evans espalda contra la pared. Contraiga los músculos de evans abdomen. Lentamente deslice evans cuerpo hasta que nohelia rodillas queden dobladas en un ángulo de 45 grados. Mantenga esta posición por 5 segundos. Deslice lentamente evans espalda hacia arriba hasta quedar de pie. Repita 10 veces.         Posición de acurrucarse: Acuéstese boca arriba con nohelia rodillas dobladas y nohelia pies planos sobre el piso. Coloque nohelia peter con las cb hacia abajo debajo de la curva de la parte baja de evans espalda. Después, con nohelia codos sobre el piso, levante nohelia hombros y pecho  entre 2 a 3 pulgadas. Mantenga evans samuel a la misma altura de nohelia hombros. Mantenga esta posición por 5 segundos. Cuando usted pueda hacer mabel ejercicio sin sentir dolor por 10 a 15 segundos, puede entonces añadir norman rotación. Mientras nohelia hombros y evans pecho estén levantados del piso, voltee levemente hacia la izquierda y sostenga. Repita en el otro lado.         Ejercicio pájaro abdi: Coloque nohelia peter y rodillas sobre el piso. Mantenga nohelia muñecas directamente debajo de nohelia hombros y nohelia rodillas directamente debajo de nohelia caderas. Contraiga evans ombligo hacia adentro en dirección a evans columna. No estire ni arquee evans espalda. Ponga tensos nohelia músculos abdominales. Levante un brazo extendido para que se alinee con evans samuel. Luego, levante la pierna opuesta a evans brazo. Mantenga esta posición por 15 segundos. Baje evans brazo y pierna lentamente y cambie de lado. Jenna 5 series.       Acuda a la consulta de control con evans médico según las indicaciones: Anote nohelia preguntas para que se acuerde de hacerlas eron nohelia visitas.  © Copyright Merative 2023 Information is for End User's use only and may not be sold, redistributed or otherwise used for commercial purposes.  Esta información es sólo para uso en educación. Evans intención no es darle un consejo médico sobre enfermedades o tratamientos. Colsulte con evans médico, enfermera o farmacéutico antes de seguir cualquier régimen médico para saber si es seguro y efectivo para usted.      Ejercicios para fortalecer los músculos del tronco   CUIDADO AMBULATORIO:   Lo que debe saber sobre los ejercicios para fortalecer los músculos del tronco: El tronco incluye los músculos de la parte baja de la espalda, la cadera, la pelvis y el abdomen. Los ejercicios para fortalecer los músculos del tronco ayudan a sanar y fortalecer estos músculos. Nile ayuda a prevenir otra lesión y mantiene la pelvis, la columna vertebral y la cadera en la posición correcta.  Llame a evans médico o  fisioterapeuta si:  Tiene dolor tali o creciente cuando hace ejercicio o está en reposo.    Tiene preguntas o inquietudes acerca de los ejercicios de hombros.    Consejos de seguridad: Consulte a evans médico antes de empezar un régimen de ejercicios. Un fisioterapeuta puede enseñarle a hacer ejercicios para fortalecer los músculos del tronco de forma freire.  Jenna los ejercicios sobre norman colchoneta o superficie firme. Norman superficie firme sostendrá evans columna y evitará el dolor lumbar. No jenna estos ejercicios en norman cama.    Muévase lenta y suavemente. Evite movimientos rápidos o bruscos.    Deténgase si siente dolor. Podría sentir alguna molestia al principio, yury no debería sentir dolor. Informe a evans médico o fisioterapeuta si tiene dolor mientras hace ejercicio. Practicar los ejercicios con regularidad ayudará a disminuir evans incomodidad con el paso del tiempo.    Respire normalmente eron los ejercicios. No contenga la respiración. [place] puede aumentar la presión arterial y evitar el fortalecimiento muscular. Evnas médico le dirá cuándo inhalar y exhalar eron el ejercicio.    Comience todos nohelia ejercicios con tonificación abdominal. La tonificación abdominal ayuda a calentar los músculos del tronco. También puede practicar tonificación abdominal eron el día. Acuéstese boca arriba con nohelia rodillas dobladas y nohelia pies planos sobre el piso. Coloque nohelia brazos en norman posición relajada junto a evans cuerpo. Ponga tensos nohelia músculos abdominales. Contraiga el ombligo hacia adentro en dirección a la columna. Sostenga está posición por 5 segundos. Relaje nohelia músculos. Repita 10 veces.       Ejercicios para fortalecer los músculos del tronco: Evans médico le indicará con qué frecuencia hacer estos ejercicios. También le dirá cuántas repeticiones de cada ejercicio debe hacer. Jenna cada ejercicio eron 5 segundos o según lo indicado. A medida que se fortalezca, aumente a 10 a 15 segundos. Puede hacer algunos de estos  ejercicios sobre norman pelota de estabilidad o con un peso. Pregunte a evans médico cómo utilizar norman pelota de estabilidad o un peso para estos ejercicios:  Doyle: Acuéstese boca arriba con nohelia rodillas dobladas y nohelia pies planos sobre el piso. Relaje nohelia brazos a los lados de evans cuerpo. Contraiga los músculos de los glúteos y luego levante nohelia caderas a 1 pulgada del piso. Sostenga está posición por 5 segundos. Cuando usted pueda hacer mabel ejercicio sin sentir dolor por 10 segundos, puede entonces aumentar la distancia de nohelia caderas al piso. Norman buena meta es poder llegar a levantar nohelia caderas tanto que nohelia hombros, caderas y las rodillas estén en norman línea recta.         Insecto muerto: Acuéstese boca arriba con nohelia rodillas dobladas y nohelia pies planos sobre el piso. Coloque nohelia brazos en norman posición relajada junto a evans cuerpo. Empiece con la tonificación abdominal. Luego levante norman pierna, manteniendo evans rodilla doblada. Sostenga está posición por 5 segundos. Repita el ejercicio con la otra pierna. Cuando pueda realizar mabel ejercicio sin sentir dolor por 10 a 15 segundos, entonces usted puede levantar norman pierna estirada y sostener. Repita el ejercicio con la otra pierna.         Cuadrúpedos: Coloque nohelia peter y rodillas sobre el piso. Mantenga nohelia muñecas directamente debajo de nohelia hombros y nohelia rodillas directamente debajo de nohelia caderas. Contraiga evans ombligo hacia adentro en dirección a evans columna. No estire ni arquee evans espalda. Contraiga nohelia músculos abdominales por debajo de evans ombligo. Sostenga está posición por 5 segundos. Cuando usted pueda hacer mabel ejercicio sin sentir dolor por 10 a 15 segundos, entonces puede extender un brazo y sostenerlo. Repita en el otro lado.         Ejercicios de doyle lateral:     Doyle lateral de pie: De pie junto a norman pared, extienda un brazo hacia la pared. Coloque la bro de evans mano plana sobre la pared con nohelia dedos hacia arriba. Empiece con la tonificación  abdominal. Después, sin  nohelia pies, lentamente doble evans brazo a 90 grados. Sostenga está posición por 5 segundos. Repita en el otro lado. Cuando usted pueda hacer mabel ejercicio sin sentir dolor por 10 a 15 segundos, entonces puede hacer un doyle lateral doblando la pierna en el piso.         Doyle lateral con pierna doblada: Acuéstese de lado con nohelia piernas, caderas y hombros en línea recta. Apóyese en evans antebrazo a fin de que evans codo esté directamente debajo de evans hombro. Doble nohelia rodillas a 90 grados. Empiece con la tonificación abdominal. Después, levante nohelia caderas y mantenga el equilibrio sobre evans antebrazo y rodillas. Sostenga está posición por 5 segundos. Repita en el otro lado. Cuando usted pueda hacer mabel ejercicio sin sentir dolor por 10 a 15 segundos, entonces puede hacer un doyle lateral con la pierna estirada en el piso.         Doyle lateral con pierna estirada: Acuéstese de lado con nohelia piernas, caderas y hombros en línea recta. Apóyese en evans antebrazo a fin de que evans codo esté directamente debajo de evans hombro. Empiece con la tonificación abdominal. Levante nohelia caderas del piso y mantenga el equilibrio sobre evans antebrazo y la parte de afuera de evans pie flexionado. No permita que evans tobillo se doble de lado. Sostenga está posición por 5 segundos. Repita en el otro lado. Cuando usted pueda hacer mabel ejercicio sin sentir dolor por 10 a 15 segundos, solicite a evans médico que le enseñe ejercicios de un nivel más avanzado.       Superman: Acuéstese boca abajo. Extienda los brazos hacia adelante en el piso. Apriete los músculos abdominales y levante la mano derecha y la pierna izquierda del suelo. Mantenga esta posición. Despacio regrese a la posición inicial. Apriete los músculos abdominales y levante la mano izquierda y la pierna derecha del suelo. Mantenga esta posición. Despacio regrese a la posición inicial.         Almeja: Acuéstese de costado con las rodillas dobladas. Coloque el brazo  inferior bajo la samuel para mantener el christopher en línea. Ponga evans mano superior sobre la cadera para evitar que se mueva la pelvis. Junte los talones y manténgalos juntos eron mabel ejercicio. Lentamente levante la rodilla superior hacia el techo. Luego baje la pierna hasta que las rodillas vuelvan a juntarse. Repita mabel ejercicio 10 veces. Luego cambie de lado y jenna el ejercicio 10 veces con la otra pierna.         Posición de acurrucarse: Acuéstese boca arriba con suzan rodillas dobladas y uszan pies planos sobre el piso. Coloque las peter con las cb hacia abajo por debajo de la parte baja de evnas espalda. Después, con los codos sobre el piso, levante los hombros y el pecho de 2 a 3 pulgadas del piso. Mantenga evans samuel a la misma altura de suzan hombros. Mantenga esta posición. Despacio regrese a la posición inicial.         Levantamiento de la pierna estirada: Acuéstese boca arriba con norman pierna estirada. Doble la otra rodilla y coloque el pie en posición plana sobre el piso. Ponga tensos suzan músculos abdominales. Mantenga la pierna recta y levántela lentamente de 6 a 12 pulgadas del piso. Mantenga esta posición. Baje evans pierna lentamente. Jenna tantas repeticiones erin le indicaron de mabel lado. Repita el ejercicio con la otra pierna.         Tablón: Acuéstese boca abajo. Doble los codos y coloque los antebrazos en posición plana sobre el suelo. Levante evans pecho, el estómago y las rodillas del suelo. Asegúrese de que los codos estén por debajo de los hombros. Evans cuerpo debe estar en línea recta. No deje que las caderas o la parte baja de la espalda se hundan hacia el suelo. Contraiga los músculos abdominales y sostenga eron 15 segundos. Para hacer mabel ejercicio más difícil, sostenga eron 30 segundos o levante 1 pierna a la vez.         Bicicletas: Acuéstese boca arriba. Doble ambas rodillas y llévelas hacia evans pecho. Suzan pantorrillas deben estar paralelas al piso. Coloque las cb de las peter en la  parte posterior de la samuel. Estire la pierna derecha y manténgala levantada 2 pulgadas del piso. Levante la samuel y los hombros del piso y gire hacia la izquierda. Mantenga la samuel y los hombros levantados. Doble la rodilla derecha mientras endereza la pierna izquierda. Mantenga la pierna izquierda 2 pulgadas sobre el piso. Gire la samuel y el pecho hacia la pierna izquierda. Siga enderezando 1 pierna a la vez y gire.       Acuda a nohelia consultas de control con evans médico o fisioterapeuta según le indicaron: Anote nohelia preguntas para que se acuerde de hacerlas eron nohelia visitas.  © Copyright Merative 2023 Information is for End User's use only and may not be sold, redistributed or otherwise used for commercial purposes.  Esta información es sólo para uso en educación. Evans intención no es darle un consejo médico sobre enfermedades o tratamientos. Colsulte con evans médico, enfermera o farmacéutico antes de seguir cualquier régimen médico para saber si es seguro y efectivo para usted.

## 2024-06-14 NOTE — PROGRESS NOTES
Ambulatory Visit  Name: Mira Evans      : 1975      MRN: [id number]  Encounter Provider: [facility]  Encounter Date: 2024   Encounter department: [facility]    Assessment & Plan   1. Chronic midline low back pain with bilateral sciatica  -     XR spine lumbar minimum 4 views non injury; Future; Expected date: 2024  -     Ambulatory Referral to Physical Therapy; Future  -     methylPREDNISolone 4 MG tablet therapy pack; Use as directed on package  -     naproxen (Naprosyn) 500 mg tablet; Take 1 tablet (500 mg total) by mouth 2 (two) times a day as needed for moderate pain  -     methocarbamol (ROBAXIN) 500 mg tablet; Take 1 tablet (500 mg total) by mouth 4 (four) times a day  2. Chronic left shoulder pain  -     XR shoulder 1 vw left; Future; Expected date: 2024  -     methylPREDNISolone 4 MG tablet therapy pack; Use as directed on package  -     naproxen (Naprosyn) 500 mg tablet; Take 1 tablet (500 mg total) by mouth 2 (two) times a day as needed for moderate pain  -     methocarbamol (ROBAXIN) 500 mg tablet; Take 1 tablet (500 mg total) by mouth 4 (four) times a day  3. Primary hypertension  -     amLODIPine (NORVASC) 5 mg tablet; Take 1 tablet (5 mg total) by mouth daily    Denies numbness/ tingling, incontinence, falls, weakness, fever, saddle anesthesia. Discussed non pharmacological pain interventions including: heat, ice, stretching, strengthening exercises, biofeedback, meditation, yoga, massage. Refill of BP medication provided but she needs to return and establish with new PCP.            History of Present Illness     Patient is a 49 YO female who  has a past medical history of Asthma, H. pylori infection, Hyperlipidemia, and Hypertension who presents today for same day visit for back pain. Denies numbness/ tingling, incontinence, falls, weakness, fever, saddle anesthesia who presents today for same day visit due  to chronic back and shoulder pain. Patient works in a warehouse.       Back Pain  This is a chronic problem. The current episode started more than 1 month ago. The problem occurs daily. The problem is unchanged. The pain is present in the lumbar spine. The pain does not radiate. The pain is at a severity of 6/10. The pain is moderate. The pain is Worse during the day. The symptoms are aggravated by bending and twisting. Pertinent negatives include no abdominal pain, bladder incontinence, bowel incontinence, chest pain, dysuria, fever, headaches, leg pain, numbness, paresis, paresthesias, pelvic pain, perianal numbness, tingling or weakness.     Review of Systems   Constitutional:  Negative for fever.   Cardiovascular:  Negative for chest pain.   Gastrointestinal:  Negative for abdominal pain and bowel incontinence.   Genitourinary:  Negative for bladder incontinence, dysuria and pelvic pain.   Musculoskeletal:  Positive for arthralgias and back pain.   Neurological:  Negative for tingling, weakness, numbness, headaches and paresthesias.     Past Medical History:   Diagnosis Date    Asthma     H. pylori infection     Hyperlipidemia     Hypertension      Past Surgical History:   Procedure Laterality Date     SECTION      ,     TUBAL LIGATION Bilateral 2008     Family History   Problem Relation Age of Onset    Hypertension Mother     Vascular Disease Mother     Asthma Mother     Hypertension Father     Diabetes Father     Vascular Disease Father     No Known Problems Brother     Colon cancer Maternal Grandmother     Stroke Maternal Grandfather     Stroke Paternal Grandmother     Cancer Paternal Grandfather         throat    Breast cancer Neg Hx     Ovarian cancer Neg Hx      Social History     Tobacco Use    Smoking status: Never     Passive exposure: Never    Smokeless tobacco: Never   Vaping Use    Vaping status: Never Used   Substance and Sexual Activity    Alcohol use: Yes     Comment: couple  "times/year    Drug use: Never    Sexual activity: Yes     Partners: Male     Birth control/protection: Female Sterilization     Current Outpatient Medications on File Prior to Visit   Medication Sig    atorvastatin (LIPITOR) 10 mg tablet Take 1 tablet (10 mg total) by mouth daily    Cholecalciferol (Vitamin D) 50 MCG (2000 UT) tablet Take 1 tablet (2,000 Units total) by mouth daily    hydrOXYzine HCL (ATARAX) 25 mg tablet Take 1 tablet (25 mg total) by mouth daily at bedtime as needed (insomnia/sleep)    pantoprazole (PROTONIX) 20 mg tablet Take 1 tablet (20 mg total) by mouth daily    polyethylene glycol (GLYCOLAX) 17 GM/SCOOP powder Take 17 g by mouth daily    [DISCONTINUED] amLODIPine (NORVASC) 5 mg tablet Take 1 tablet (5 mg total) by mouth daily    [DISCONTINUED] naproxen (Naprosyn) 500 mg tablet Take 1 tablet (500 mg total) by mouth 2 (two) times a day as needed for moderate pain     Allergies   Allergen Reactions    Shellfish-Derived Products - Food Allergy Throat Swelling     Immunization History   Administered Date(s) Administered    COVID-19 PFIZER VACCINE 0.3 ML IM 05/27/2021, 06/17/2021    Influenza, injectable, quadrivalent, preservative free 0.5 mL 12/13/2023    Influenza, recombinant, quadrivalent,injectable, preservative free 09/28/2022     Objective     /84 (BP Location: Left arm, Patient Position: Sitting, Cuff Size: Standard)   Pulse 73   Temp 98 °F (36.7 °C) (Temporal)   Resp 16   Ht 5' 4\" (1.626 m)   Wt 76.7 kg (169 lb)   LMP 04/30/2024 (Approximate)   SpO2 95%   BMI 29.01 kg/m²     Physical Exam  Vitals and nursing note reviewed.   Constitutional:       General: She is not in acute distress.     Appearance: Normal appearance. She is not diaphoretic.   HENT:      Head: Normocephalic.      Right Ear: Tympanic membrane and external ear normal.      Left Ear: Tympanic membrane and external ear normal.      Nose: Nose normal.      Mouth/Throat:      Mouth: Mucous membranes are moist. "   Eyes:      General:         Right eye: No discharge.         Left eye: No discharge.      Extraocular Movements: Extraocular movements intact.      Conjunctiva/sclera: Conjunctivae normal.      Pupils: Pupils are equal, round, and reactive to light.   Cardiovascular:      Rate and Rhythm: Normal rate and regular rhythm.   Pulmonary:      Effort: Pulmonary effort is normal. No respiratory distress.      Breath sounds: Normal breath sounds.   Musculoskeletal:      Left shoulder: Tenderness present. Normal range of motion.      Cervical back: Normal range of motion and neck supple. No rigidity.      Lumbar back: Tenderness present. Normal range of motion. Positive left straight leg raise test. Negative right straight leg raise test.      Right lower leg: No edema.      Left lower leg: No edema.   Lymphadenopathy:      Cervical: No cervical adenopathy.   Skin:     General: Skin is warm and dry.      Capillary Refill: Capillary refill takes less than 2 seconds.      Findings: No rash.   Neurological:      General: No focal deficit present.      Mental Status: She is alert and oriented to person, place, and time.   Psychiatric:         Mood and Affect: Mood normal.         Behavior: Behavior normal.       Administrative Statements

## 2024-07-09 ENCOUNTER — HOSPITAL ENCOUNTER (OUTPATIENT)
Dept: MAMMOGRAPHY | Facility: CLINIC | Age: 49
Discharge: HOME | End: 2024-07-09
Payer: COMMERCIAL

## 2024-07-09 VITALS — HEIGHT: 64 IN | WEIGHT: 169 LBS | BODY MASS INDEX: 28.85 KG/M2

## 2024-07-09 DIAGNOSIS — N64.4 BREAST PAIN, LEFT: ICD-10-CM

## 2024-07-09 PROCEDURE — 77066 DX MAMMO INCL CAD BI: CPT

## 2024-07-09 PROCEDURE — G0279 TOMOSYNTHESIS, MAMMO: HCPCS

## 2024-07-09 PROCEDURE — 76642 ULTRASOUND BREAST LIMITED: CPT

## 2024-09-18 DIAGNOSIS — I10 PRIMARY HYPERTENSION: ICD-10-CM

## 2024-09-18 DIAGNOSIS — E78.5 HYPERLIPIDEMIA, UNSPECIFIED HYPERLIPIDEMIA TYPE: ICD-10-CM

## 2024-09-19 RX ORDER — AMLODIPINE BESYLATE 5 MG/1
5 TABLET ORAL DAILY
Qty: 90 TABLET | Refills: 0 | Status: SHIPPED | OUTPATIENT
Start: 2024-09-19 | End: 2024-09-26 | Stop reason: SDUPTHER

## 2024-09-19 RX ORDER — ATORVASTATIN CALCIUM 10 MG/1
10 TABLET, FILM COATED ORAL DAILY
Qty: 90 TABLET | Refills: 1 | Status: SHIPPED | OUTPATIENT
Start: 2024-09-19 | End: 2024-09-26 | Stop reason: SDUPTHER

## 2024-09-26 ENCOUNTER — OFFICE VISIT (OUTPATIENT)
Dept: FAMILY MEDICINE CLINIC | Facility: CLINIC | Age: 49
End: 2024-09-26

## 2024-09-26 VITALS
HEIGHT: 64 IN | BODY MASS INDEX: 29.02 KG/M2 | DIASTOLIC BLOOD PRESSURE: 80 MMHG | WEIGHT: 170 LBS | HEART RATE: 84 BPM | SYSTOLIC BLOOD PRESSURE: 130 MMHG | OXYGEN SATURATION: 95 % | TEMPERATURE: 97.6 F | RESPIRATION RATE: 16 BRPM

## 2024-09-26 DIAGNOSIS — I10 PRIMARY HYPERTENSION: ICD-10-CM

## 2024-09-26 DIAGNOSIS — E66.09 CLASS 1 OBESITY DUE TO EXCESS CALORIES WITH SERIOUS COMORBIDITY AND BODY MASS INDEX (BMI) OF 32.0 TO 32.9 IN ADULT: Primary | ICD-10-CM

## 2024-09-26 DIAGNOSIS — E66.811 CLASS 1 OBESITY DUE TO EXCESS CALORIES WITH SERIOUS COMORBIDITY AND BODY MASS INDEX (BMI) OF 32.0 TO 32.9 IN ADULT: Primary | ICD-10-CM

## 2024-09-26 DIAGNOSIS — M25.512 CHRONIC LEFT SHOULDER PAIN: ICD-10-CM

## 2024-09-26 DIAGNOSIS — E55.9 VITAMIN D DEFICIENCY: ICD-10-CM

## 2024-09-26 DIAGNOSIS — G47.00 INSOMNIA, UNSPECIFIED TYPE: ICD-10-CM

## 2024-09-26 DIAGNOSIS — M54.41 CHRONIC MIDLINE LOW BACK PAIN WITH BILATERAL SCIATICA: ICD-10-CM

## 2024-09-26 DIAGNOSIS — E78.5 HYPERLIPIDEMIA, UNSPECIFIED HYPERLIPIDEMIA TYPE: ICD-10-CM

## 2024-09-26 DIAGNOSIS — G89.29 CHRONIC MIDLINE LOW BACK PAIN WITH BILATERAL SCIATICA: ICD-10-CM

## 2024-09-26 DIAGNOSIS — M54.42 CHRONIC MIDLINE LOW BACK PAIN WITH BILATERAL SCIATICA: ICD-10-CM

## 2024-09-26 DIAGNOSIS — K21.9 GASTROESOPHAGEAL REFLUX DISEASE WITHOUT ESOPHAGITIS: ICD-10-CM

## 2024-09-26 DIAGNOSIS — K59.00 CONSTIPATION, UNSPECIFIED CONSTIPATION TYPE: ICD-10-CM

## 2024-09-26 DIAGNOSIS — G89.29 CHRONIC LEFT SHOULDER PAIN: ICD-10-CM

## 2024-09-26 PROCEDURE — 99214 OFFICE O/P EST MOD 30 MIN: CPT | Performed by: NURSE PRACTITIONER

## 2024-09-26 RX ORDER — ATORVASTATIN CALCIUM 10 MG/1
10 TABLET, FILM COATED ORAL DAILY
Qty: 90 TABLET | Refills: 1 | Status: SHIPPED | OUTPATIENT
Start: 2024-09-26

## 2024-09-26 RX ORDER — METHOCARBAMOL 500 MG/1
500 TABLET, FILM COATED ORAL 4 TIMES DAILY
Qty: 40 TABLET | Refills: 0 | Status: SHIPPED | OUTPATIENT
Start: 2024-09-26

## 2024-09-26 RX ORDER — POLYETHYLENE GLYCOL 3350 17 G/17G
17 POWDER, FOR SOLUTION ORAL DAILY
Qty: 578 G | Refills: 1 | Status: SHIPPED | OUTPATIENT
Start: 2024-09-26

## 2024-09-26 RX ORDER — HYDROXYZINE HYDROCHLORIDE 25 MG/1
25 TABLET, FILM COATED ORAL
Qty: 30 TABLET | Refills: 2 | Status: SHIPPED | OUTPATIENT
Start: 2024-09-26

## 2024-09-26 RX ORDER — PANTOPRAZOLE SODIUM 20 MG/1
20 TABLET, DELAYED RELEASE ORAL DAILY
Qty: 30 TABLET | Refills: 2 | Status: SHIPPED | OUTPATIENT
Start: 2024-09-26

## 2024-09-26 RX ORDER — CHOLECALCIFEROL (VITAMIN D3) 50 MCG
2000 TABLET ORAL DAILY
Qty: 90 TABLET | Refills: 3 | Status: SHIPPED | OUTPATIENT
Start: 2024-09-26

## 2024-09-26 RX ORDER — AMLODIPINE BESYLATE 5 MG/1
5 TABLET ORAL DAILY
Qty: 90 TABLET | Refills: 0 | Status: SHIPPED | OUTPATIENT
Start: 2024-09-26

## 2024-09-26 NOTE — ASSESSMENT & PLAN NOTE
-Discussed diet and lifestyle interventions to improve sx including: avoidance of common triggers (chocolate, caffeine, tomatoes, citrus), eat small meals frequently, remain upright after meals     Orders:    pantoprazole (PROTONIX) 20 mg tablet; Take 1 tablet (20 mg total) by mouth daily

## 2024-09-26 NOTE — ASSESSMENT & PLAN NOTE
Orders:    Cholecalciferol (Vitamin D) 50 MCG (2000 UT) tablet; Take 1 tablet (2,000 Units total) by mouth daily    Vitamin D 25 hydroxy; Future

## 2024-09-26 NOTE — ASSESSMENT & PLAN NOTE
Incorporate sleep hygiene   Orders:    hydrOXYzine HCL (ATARAX) 25 mg tablet; Take 1 tablet (25 mg total) by mouth daily at bedtime as needed (insomnia/sleep)

## 2024-09-26 NOTE — ASSESSMENT & PLAN NOTE
Lab Results   Component Value Date    CHOLESTEROL 210 (H) 12/23/2023    CHOLESTEROL 213 (H) 10/01/2022     Lab Results   Component Value Date    HDL 52 12/23/2023    HDL 39 (L) 10/01/2022     Lab Results   Component Value Date    TRIG 116 12/23/2023    TRIG 124 10/01/2022     Lab Results   Component Value Date    NONHDLC 158 12/23/2023    NONHDLC 174 10/01/2022     Lab Results   Component Value Date    LDLCALC 135 (H) 12/23/2023     Restart atorvastatin 10 mg daily   Orders:    atorvastatin (LIPITOR) 10 mg tablet; Take 1 tablet (10 mg total) by mouth daily

## 2024-09-26 NOTE — ASSESSMENT & PLAN NOTE
Complete previously ordered imaging   Orders:    methocarbamol (ROBAXIN) 500 mg tablet; Take 1 tablet (500 mg total) by mouth 4 (four) times a day    Diclofenac Sodium (VOLTAREN) 1 %; Apply 2 g topically 4 (four) times a day

## 2024-09-26 NOTE — PROGRESS NOTES
Ambulatory Visit  Name: Mira Evans      : 1975      MRN: [id number]  Encounter Provider: HAYDER Goff  Encounter Date: 2024   Encounter department: [facility]    Assessment & Plan  Primary hypertension  Currently out of medication, restart amlodipine 5 mg daily     Orders:    amLODIPine (NORVASC) 5 mg tablet; Take 1 tablet (5 mg total) by mouth daily    Hyperlipidemia, unspecified hyperlipidemia type  Lab Results   Component Value Date    CHOLESTEROL 210 (H) 2023    CHOLESTEROL 213 (H) 10/01/2022     Lab Results   Component Value Date    HDL 52 2023    HDL 39 (L) 10/01/2022     Lab Results   Component Value Date    TRIG 116 2023    TRIG 124 10/01/2022     Lab Results   Component Value Date    NONHDLC 158 2023    NONHDLC 174 10/01/2022     Lab Results   Component Value Date    LDLCALC 135 (H) 2023     Restart atorvastatin 10 mg daily   Orders:    atorvastatin (LIPITOR) 10 mg tablet; Take 1 tablet (10 mg total) by mouth daily    Vitamin D deficiency    Orders:    Cholecalciferol (Vitamin D) 50 MCG (2000 UT) tablet; Take 1 tablet (2,000 Units total) by mouth daily    Vitamin D 25 hydroxy; Future    Insomnia, unspecified type  Incorporate sleep hygiene   Orders:    hydrOXYzine HCL (ATARAX) 25 mg tablet; Take 1 tablet (25 mg total) by mouth daily at bedtime as needed (insomnia/sleep)    Chronic midline low back pain with bilateral sciatica  Complete previously ordered imaging   Orders:    methocarbamol (ROBAXIN) 500 mg tablet; Take 1 tablet (500 mg total) by mouth 4 (four) times a day    Diclofenac Sodium (VOLTAREN) 1 %; Apply 2 g topically 4 (four) times a day    Chronic left shoulder pain  Complete previously ordered imaging   Orders:    methocarbamol (ROBAXIN) 500 mg tablet; Take 1 tablet (500 mg total) by mouth 4 (four) times a day    Diclofenac Sodium (VOLTAREN) 1 %; Apply 2 g topically 4 (four) times a  day    Gastroesophageal reflux disease without esophagitis  -Discussed diet and lifestyle interventions to improve sx including: avoidance of common triggers (chocolate, caffeine, tomatoes, citrus), eat small meals frequently, remain upright after meals     Orders:    pantoprazole (PROTONIX) 20 mg tablet; Take 1 tablet (20 mg total) by mouth daily    Constipation, unspecified constipation type    Orders:    polyethylene glycol (GLYCOLAX) 17 GM/SCOOP powder; Take 17 g by mouth daily    Class 1 obesity due to excess calories with serious comorbidity and body mass index (BMI) of 32.0 to 32.9 in adult  -Encouraged diet and lifestyle changes: decrease processed foods (cakes, cookies, chips, soda), decrease total carbohydrate intake, decrease fried/fatty foods, increase fruits and vegetables, increase lean proteins (chicken, turkey), increase healthy fats (avocado, fish, nuts), drink plenty of water (at least four 16 oz bottles per day)    Orders:    CBC and differential; Future    Comprehensive metabolic panel; Future    Hemoglobin A1C; Future    Lipid panel; Future       History of Present Illness     Patient is a 48 YO female who  has a past medical history of Asthma, H. pylori infection, Hyperlipidemia, and Hypertension who presents today for follow up.            Shoulder Pain   The pain is present in the left shoulder. This is a chronic problem. The current episode started more than 1 year ago. There has been no history of extremity trauma. The problem occurs daily. The problem has been waxing and waning. The quality of the pain is described as aching. Pertinent negatives include no fever or numbness.         Review of Systems   Constitutional:  Negative for activity change, appetite change, chills, fatigue, fever and unexpected weight change.   HENT:  Negative for hearing loss, nosebleeds, sinus pain, sneezing, sore throat and trouble swallowing.    Eyes:  Negative for photophobia and visual disturbance.  "  Respiratory:  Negative for cough, chest tightness, shortness of breath and wheezing.    Cardiovascular:  Negative for chest pain, palpitations and leg swelling.   Gastrointestinal:  Negative for abdominal pain, constipation, nausea and vomiting.   Genitourinary:  Negative for decreased urine volume, difficulty urinating, dysuria, flank pain, genital sores, hematuria and urgency.   Musculoskeletal:  Positive for arthralgias, back pain and myalgias. Negative for gait problem.   Skin:  Negative for pallor, rash and wound.   Neurological:  Negative for dizziness, seizures, syncope, weakness, numbness and headaches.   Hematological:  Negative for adenopathy. Does not bruise/bleed easily.   Psychiatric/Behavioral:  Negative for confusion, hallucinations, self-injury, sleep disturbance and suicidal ideas. The patient is not nervous/anxious.            Objective     /80 (BP Location: Right arm, Patient Position: Sitting, Cuff Size: Standard)   Pulse 84   Temp 97.6 °F (36.4 °C) (Temporal)   Resp 16   Ht 5' 4\" (1.626 m)   Wt 77.1 kg (170 lb)   SpO2 95%   BMI 29.18 kg/m²     Physical Exam  Vitals and nursing note reviewed.   Constitutional:       General: She is not in acute distress.     Appearance: Normal appearance. She is not diaphoretic.   HENT:      Head: Normocephalic.      Right Ear: Tympanic membrane and external ear normal.      Left Ear: Tympanic membrane and external ear normal.      Nose: Nose normal.      Mouth/Throat:      Mouth: Mucous membranes are moist.   Eyes:      General:         Right eye: No discharge.         Left eye: No discharge.      Extraocular Movements: Extraocular movements intact.      Conjunctiva/sclera: Conjunctivae normal.      Pupils: Pupils are equal, round, and reactive to light.   Cardiovascular:      Rate and Rhythm: Normal rate and regular rhythm.   Pulmonary:      Effort: Pulmonary effort is normal. No respiratory distress.      Breath sounds: Normal breath sounds. "   Musculoskeletal:      Left shoulder: Tenderness present. Normal range of motion.      Cervical back: Normal range of motion and neck supple. No rigidity.      Lumbar back: Tenderness present. Normal range of motion. Positive left straight leg raise test. Negative right straight leg raise test.      Right lower leg: No edema.      Left lower leg: No edema.   Lymphadenopathy:      Cervical: No cervical adenopathy.   Skin:     General: Skin is warm and dry.      Capillary Refill: Capillary refill takes less than 2 seconds.      Findings: No rash.   Neurological:      General: No focal deficit present.      Mental Status: She is alert and oriented to person, place, and time.   Psychiatric:         Mood and Affect: Mood normal.         Behavior: Behavior normal.          Applied

## 2024-09-26 NOTE — ASSESSMENT & PLAN NOTE
Currently out of medication, restart amlodipine 5 mg daily     Orders:    amLODIPine (NORVASC) 5 mg tablet; Take 1 tablet (5 mg total) by mouth daily

## 2024-09-27 NOTE — ASSESSMENT & PLAN NOTE
-Encouraged diet and lifestyle changes: decrease processed foods (cakes, cookies, chips, soda), decrease total carbohydrate intake, decrease fried/fatty foods, increase fruits and vegetables, increase lean proteins (chicken, turkey), increase healthy fats (avocado, fish, nuts), drink plenty of water (at least four 16 oz bottles per day)    Orders:    CBC and differential; Future    Comprehensive metabolic panel; Future    Hemoglobin A1C; Future    Lipid panel; Future

## 2024-11-04 ENCOUNTER — TELEPHONE (OUTPATIENT)
Dept: VASCULAR SURGERY | Facility: CLINIC | Age: 49
End: 2024-11-04

## 2024-11-04 NOTE — TELEPHONE ENCOUNTER
11/5/24 appt cancelled due to provider out, left message to reschedule appt with any ap. Also sent KATIE message.

## 2024-11-16 ENCOUNTER — APPOINTMENT (OUTPATIENT)
Dept: LAB | Facility: HOSPITAL | Age: 49
End: 2024-11-16
Payer: COMMERCIAL

## 2024-11-16 DIAGNOSIS — E55.9 VITAMIN D DEFICIENCY: ICD-10-CM

## 2024-11-16 DIAGNOSIS — E66.09 CLASS 1 OBESITY DUE TO EXCESS CALORIES WITH SERIOUS COMORBIDITY AND BODY MASS INDEX (BMI) OF 32.0 TO 32.9 IN ADULT: ICD-10-CM

## 2024-11-16 DIAGNOSIS — E66.811 CLASS 1 OBESITY DUE TO EXCESS CALORIES WITH SERIOUS COMORBIDITY AND BODY MASS INDEX (BMI) OF 32.0 TO 32.9 IN ADULT: ICD-10-CM

## 2024-11-16 LAB
25(OH)D3 SERPL-MCNC: 16 NG/ML (ref 30–100)
ALBUMIN SERPL BCG-MCNC: 4.3 G/DL (ref 3.5–5)
ALP SERPL-CCNC: 44 U/L (ref 34–104)
ALT SERPL W P-5'-P-CCNC: 10 U/L (ref 7–52)
ANION GAP SERPL CALCULATED.3IONS-SCNC: 7 MMOL/L (ref 4–13)
AST SERPL W P-5'-P-CCNC: 13 U/L (ref 13–39)
BASOPHILS # BLD AUTO: 0.04 THOUSANDS/ÂΜL (ref 0–0.1)
BASOPHILS NFR BLD AUTO: 1 % (ref 0–1)
BILIRUB SERPL-MCNC: 0.67 MG/DL (ref 0.2–1)
BUN SERPL-MCNC: 13 MG/DL (ref 5–25)
CALCIUM SERPL-MCNC: 9.3 MG/DL (ref 8.4–10.2)
CHLORIDE SERPL-SCNC: 102 MMOL/L (ref 96–108)
CHOLEST SERPL-MCNC: 197 MG/DL (ref ?–200)
CO2 SERPL-SCNC: 28 MMOL/L (ref 21–32)
CREAT SERPL-MCNC: 0.67 MG/DL (ref 0.6–1.3)
EOSINOPHIL # BLD AUTO: 0.11 THOUSAND/ÂΜL (ref 0–0.61)
EOSINOPHIL NFR BLD AUTO: 1 % (ref 0–6)
ERYTHROCYTE [DISTWIDTH] IN BLOOD BY AUTOMATED COUNT: 12.1 % (ref 11.6–15.1)
EST. AVERAGE GLUCOSE BLD GHB EST-MCNC: 105 MG/DL
GFR SERPL CREATININE-BSD FRML MDRD: 102 ML/MIN/1.73SQ M
GLUCOSE P FAST SERPL-MCNC: 97 MG/DL (ref 65–99)
HBA1C MFR BLD: 5.3 %
HCT VFR BLD AUTO: 40.5 % (ref 34.8–46.1)
HDLC SERPL-MCNC: 51 MG/DL
HGB BLD-MCNC: 13.8 G/DL (ref 11.5–15.4)
IMM GRANULOCYTES # BLD AUTO: 0.02 THOUSAND/UL (ref 0–0.2)
IMM GRANULOCYTES NFR BLD AUTO: 0 % (ref 0–2)
LDLC SERPL CALC-MCNC: 125 MG/DL (ref 0–100)
LYMPHOCYTES # BLD AUTO: 2.57 THOUSANDS/ÂΜL (ref 0.6–4.47)
LYMPHOCYTES NFR BLD AUTO: 33 % (ref 14–44)
MCH RBC QN AUTO: 32.3 PG (ref 26.8–34.3)
MCHC RBC AUTO-ENTMCNC: 34.1 G/DL (ref 31.4–37.4)
MCV RBC AUTO: 95 FL (ref 82–98)
MONOCYTES # BLD AUTO: 0.79 THOUSAND/ÂΜL (ref 0.17–1.22)
MONOCYTES NFR BLD AUTO: 10 % (ref 4–12)
NEUTROPHILS # BLD AUTO: 4.3 THOUSANDS/ÂΜL (ref 1.85–7.62)
NEUTS SEG NFR BLD AUTO: 55 % (ref 43–75)
NONHDLC SERPL-MCNC: 146 MG/DL
NRBC BLD AUTO-RTO: 0 /100 WBCS
PLATELET # BLD AUTO: 256 THOUSANDS/UL (ref 149–390)
PMV BLD AUTO: 10.6 FL (ref 8.9–12.7)
POTASSIUM SERPL-SCNC: 4.2 MMOL/L (ref 3.5–5.3)
PROT SERPL-MCNC: 7.4 G/DL (ref 6.4–8.4)
RBC # BLD AUTO: 4.27 MILLION/UL (ref 3.81–5.12)
SODIUM SERPL-SCNC: 137 MMOL/L (ref 135–147)
TRIGL SERPL-MCNC: 103 MG/DL (ref ?–150)
WBC # BLD AUTO: 7.83 THOUSAND/UL (ref 4.31–10.16)

## 2024-11-16 PROCEDURE — 85025 COMPLETE CBC W/AUTO DIFF WBC: CPT

## 2024-11-16 PROCEDURE — 83036 HEMOGLOBIN GLYCOSYLATED A1C: CPT

## 2024-11-16 PROCEDURE — 80061 LIPID PANEL: CPT

## 2024-11-16 PROCEDURE — 36415 COLL VENOUS BLD VENIPUNCTURE: CPT

## 2024-11-16 PROCEDURE — 80053 COMPREHEN METABOLIC PANEL: CPT

## 2024-11-16 PROCEDURE — 82306 VITAMIN D 25 HYDROXY: CPT

## 2024-12-03 ENCOUNTER — OFFICE VISIT (OUTPATIENT)
Dept: FAMILY MEDICINE CLINIC | Facility: CLINIC | Age: 49
End: 2024-12-03

## 2024-12-03 VITALS
SYSTOLIC BLOOD PRESSURE: 130 MMHG | TEMPERATURE: 98 F | HEIGHT: 64 IN | HEART RATE: 88 BPM | OXYGEN SATURATION: 98 % | DIASTOLIC BLOOD PRESSURE: 80 MMHG | WEIGHT: 171 LBS | BODY MASS INDEX: 29.19 KG/M2 | RESPIRATION RATE: 16 BRPM

## 2024-12-03 DIAGNOSIS — E55.9 VITAMIN D DEFICIENCY: ICD-10-CM

## 2024-12-03 DIAGNOSIS — E78.5 HYPERLIPIDEMIA, UNSPECIFIED HYPERLIPIDEMIA TYPE: ICD-10-CM

## 2024-12-03 DIAGNOSIS — I10 PRIMARY HYPERTENSION: Primary | ICD-10-CM

## 2024-12-03 DIAGNOSIS — G89.29 CHRONIC MIDLINE LOW BACK PAIN WITH BILATERAL SCIATICA: ICD-10-CM

## 2024-12-03 DIAGNOSIS — K21.9 GASTROESOPHAGEAL REFLUX DISEASE WITHOUT ESOPHAGITIS: ICD-10-CM

## 2024-12-03 DIAGNOSIS — G47.00 INSOMNIA, UNSPECIFIED TYPE: ICD-10-CM

## 2024-12-03 DIAGNOSIS — M54.42 CHRONIC MIDLINE LOW BACK PAIN WITH BILATERAL SCIATICA: ICD-10-CM

## 2024-12-03 DIAGNOSIS — E66.3 OVERWEIGHT: ICD-10-CM

## 2024-12-03 DIAGNOSIS — M54.41 CHRONIC MIDLINE LOW BACK PAIN WITH BILATERAL SCIATICA: ICD-10-CM

## 2024-12-03 PROBLEM — E66.09 CLASS 1 OBESITY DUE TO EXCESS CALORIES WITH SERIOUS COMORBIDITY AND BODY MASS INDEX (BMI) OF 32.0 TO 32.9 IN ADULT: Status: RESOLVED | Noted: 2023-12-24 | Resolved: 2024-12-03

## 2024-12-03 PROBLEM — E66.811 CLASS 1 OBESITY DUE TO EXCESS CALORIES WITH SERIOUS COMORBIDITY AND BODY MASS INDEX (BMI) OF 32.0 TO 32.9 IN ADULT: Status: RESOLVED | Noted: 2023-12-24 | Resolved: 2024-12-03

## 2024-12-03 PROCEDURE — 99214 OFFICE O/P EST MOD 30 MIN: CPT | Performed by: NURSE PRACTITIONER

## 2024-12-03 RX ORDER — ATORVASTATIN CALCIUM 10 MG/1
10 TABLET, FILM COATED ORAL DAILY
Qty: 90 TABLET | Refills: 1 | Status: SHIPPED | OUTPATIENT
Start: 2024-12-03

## 2024-12-03 RX ORDER — ERGOCALCIFEROL 1.25 MG/1
50000 CAPSULE, LIQUID FILLED ORAL 2 TIMES WEEKLY
Qty: 24 CAPSULE | Refills: 0 | Status: SHIPPED | OUTPATIENT
Start: 2024-12-05

## 2024-12-03 RX ORDER — PANTOPRAZOLE SODIUM 20 MG/1
20 TABLET, DELAYED RELEASE ORAL DAILY
Qty: 90 TABLET | Refills: 2 | Status: SHIPPED | OUTPATIENT
Start: 2024-12-03

## 2024-12-03 RX ORDER — AMLODIPINE BESYLATE 5 MG/1
5 TABLET ORAL DAILY
Qty: 90 TABLET | Refills: 3 | Status: SHIPPED | OUTPATIENT
Start: 2024-12-03

## 2024-12-03 NOTE — ASSESSMENT & PLAN NOTE
Lab Results   Component Value Date    CHOLESTEROL 197 11/16/2024    CHOLESTEROL 210 (H) 12/23/2023    CHOLESTEROL 213 (H) 10/01/2022     Lab Results   Component Value Date    HDL 51 11/16/2024    HDL 52 12/23/2023    HDL 39 (L) 10/01/2022     Lab Results   Component Value Date    TRIG 103 11/16/2024    TRIG 116 12/23/2023    TRIG 124 10/01/2022     Lab Results   Component Value Date    NONHDLC 146 11/16/2024    NONHDLC 158 12/23/2023    NONHDLC 174 10/01/2022     Lab Results   Component Value Date    LDLCALC 125 (H) 11/16/2024     Continue atorvastatin 10 mg daily   Orders:    atorvastatin (LIPITOR) 10 mg tablet; Take 1 tablet (10 mg total) by mouth daily

## 2024-12-03 NOTE — ASSESSMENT & PLAN NOTE
Within goal, continue current - amlodipine 5 mg daily     Orders:    amLODIPine (NORVASC) 5 mg tablet; Take 1 tablet (5 mg total) by mouth daily

## 2024-12-03 NOTE — ASSESSMENT & PLAN NOTE
Wt Readings from Last 3 Encounters:   12/03/24 77.6 kg (171 lb)   09/26/24 77.1 kg (170 lb)   07/09/24 76.7 kg (169 lb)     -Encouraged diet and lifestyle changes: decrease processed foods (cakes, cookies, chips, soda), decrease total carbohydrate intake, decrease fried/fatty foods, increase fruits and vegetables, increase lean proteins (chicken, turkey), increase healthy fats (avocado, fish, nuts), drink plenty of water (at least four 16 oz bottles per day)

## 2024-12-03 NOTE — PROGRESS NOTES
Name: Mira Evans      : 1975      MRN: [id number]  Encounter Provider: HAYDER Goff  Encounter Date: 12/3/2024   Encounter department: [facility]  :  Assessment & Plan  Primary hypertension  Within goal, continue current - amlodipine 5 mg daily     Orders:    amLODIPine (NORVASC) 5 mg tablet; Take 1 tablet (5 mg total) by mouth daily    Gastroesophageal reflux disease without esophagitis  -Discussed diet and lifestyle interventions to improve sx including: avoidance of common triggers (chocolate, caffeine, tomatoes, citrus), eat small meals frequently, remain upright after meals     Orders:    pantoprazole (PROTONIX) 20 mg tablet; Take 1 tablet (20 mg total) by mouth daily      Overweight  Wt Readings from Last 3 Encounters:   24 77.6 kg (171 lb)   24 77.1 kg (170 lb)   24 76.7 kg (169 lb)     -Encouraged diet and lifestyle changes: decrease processed foods (cakes, cookies, chips, soda), decrease total carbohydrate intake, decrease fried/fatty foods, increase fruits and vegetables, increase lean proteins (chicken, turkey), increase healthy fats (avocado, fish, nuts), drink plenty of water (at least four 16 oz bottles per day)           Hyperlipidemia, unspecified hyperlipidemia type  Lab Results   Component Value Date    CHOLESTEROL 197 2024    CHOLESTEROL 210 (H) 2023    CHOLESTEROL 213 (H) 10/01/2022     Lab Results   Component Value Date    HDL 51 2024    HDL 52 2023    HDL 39 (L) 10/01/2022     Lab Results   Component Value Date    TRIG 103 2024    TRIG 116 2023    TRIG 124 10/01/2022     Lab Results   Component Value Date    NONHDLC 146 2024    NONHDLC 158 2023    NONHDLC 174 10/01/2022     Lab Results   Component Value Date    LDLCALC 125 (H) 2024     Continue atorvastatin 10 mg daily   Orders:    atorvastatin (LIPITOR) 10 mg tablet; Take 1 tablet (10 mg total) by mouth  daily      Insomnia, unspecified type  Improved with hydroxyzine PRN   Continue working on sleep hygiene measures          Chronic midline low back pain with bilateral sciatica  Currently stable   Complete previously ordered imaging when able (deferred due to cost)         Vitamin D deficiency    Orders:    ergocalciferol (VITAMIN D2) 50,000 units; Take 1 capsule (50,000 Units total) by mouth 2 (two) times a week          Depression Screening and Follow-up Plan: Patient was screened for depression during today's encounter. They screened negative with a PHQ-2 score of 0.      History of Present Illness     Patient is a 48 YO female who  has a past medical history of Asthma, H. pylori infection, Hyperlipidemia, and Hypertension who presents today for follow up.    She reports that overall she feels well and has no new concerns. She declines any vaccines today.       The following portions of the patient's history were reviewed and updated as appropriate: allergies, current medications, past family history, past medical history, past social history, past surgical history and problem list.              Review of Systems   Constitutional:  Negative for activity change, appetite change, chills, fatigue, fever and unexpected weight change.   HENT:  Negative for hearing loss, nosebleeds, sinus pain, sneezing, sore throat and trouble swallowing.    Eyes:  Negative for photophobia and visual disturbance.   Respiratory:  Negative for cough, chest tightness, shortness of breath and wheezing.    Cardiovascular:  Negative for chest pain, palpitations and leg swelling.   Gastrointestinal:  Negative for abdominal pain, constipation, nausea and vomiting.   Genitourinary:  Negative for decreased urine volume, difficulty urinating, dysuria, flank pain, genital sores, hematuria and urgency.   Musculoskeletal:  Negative for back pain and gait problem.   Skin:  Negative for pallor, rash and wound.   Neurological:  Negative for dizziness,  "seizures, syncope, weakness, numbness and headaches.   Hematological:  Negative for adenopathy. Does not bruise/bleed easily.   Psychiatric/Behavioral:  Negative for confusion, hallucinations, self-injury, sleep disturbance and suicidal ideas. The patient is not nervous/anxious.           Objective   /80 (BP Location: Right arm, Patient Position: Sitting, Cuff Size: Standard)   Pulse 88   Temp 98 °F (36.7 °C) (Temporal)   Resp 16   Ht 5' 4\" (1.626 m)   Wt 77.6 kg (171 lb)   SpO2 98%   BMI 29.35 kg/m²      Physical Exam  Vitals and nursing note reviewed.   Constitutional:       General: She is not in acute distress.     Appearance: She is well-developed.   HENT:      Head: Normocephalic and atraumatic.      Right Ear: Tympanic membrane and external ear normal.      Left Ear: Tympanic membrane and external ear normal.      Nose: Nose normal.      Mouth/Throat:      Mouth: Mucous membranes are moist.   Eyes:      Conjunctiva/sclera: Conjunctivae normal.      Pupils: Pupils are equal, round, and reactive to light.   Cardiovascular:      Rate and Rhythm: Normal rate and regular rhythm.   Pulmonary:      Effort: Pulmonary effort is normal. No respiratory distress.      Breath sounds: Normal breath sounds.   Musculoskeletal:      Cervical back: Neck supple.   Skin:     General: Skin is warm and dry.      Capillary Refill: Capillary refill takes less than 2 seconds.   Neurological:      Mental Status: She is alert and oriented to person, place, and time.   Psychiatric:         Mood and Affect: Mood normal.         "

## 2024-12-03 NOTE — ASSESSMENT & PLAN NOTE
Orders:    ergocalciferol (VITAMIN D2) 50,000 units; Take 1 capsule (50,000 Units total) by mouth 2 (two) times a week

## 2025-01-08 ENCOUNTER — OFFICE VISIT (OUTPATIENT)
Dept: VASCULAR SURGERY | Facility: CLINIC | Age: 50
End: 2025-01-08
Payer: COMMERCIAL

## 2025-01-08 VITALS
SYSTOLIC BLOOD PRESSURE: 162 MMHG | BODY MASS INDEX: 29.19 KG/M2 | HEART RATE: 75 BPM | DIASTOLIC BLOOD PRESSURE: 106 MMHG | WEIGHT: 171 LBS | HEIGHT: 64 IN | OXYGEN SATURATION: 99 %

## 2025-01-08 DIAGNOSIS — I83.812 VARICOSE VEINS OF LEFT LOWER EXTREMITY WITH PAIN: Primary | ICD-10-CM

## 2025-01-08 PROCEDURE — 99213 OFFICE O/P EST LOW 20 MIN: CPT

## 2025-01-09 NOTE — ASSESSMENT & PLAN NOTE
Patient continues with swelling and pain to her bilateral lower extremities.  She reports minimal improvement with consistent compression stocking use.  She continues to report cramping in her bilateral calves as well as burning sensation to her bilateral feet by the end of the day, L>R.  Patient is denying any color/temperature change or tissue loss.    Plan:  -We discussed the pathophysiology of varicose veins as well as contributing lifestyle factors.  -We discussed continuing conservative measures including compression stockings, leg elevation, and regular exercise.  -As patient is denying any improvement in her lower extremity symptoms with compression stocking use, order placed for venous reflux study to be completed at patient's earliest convenience  -Return to office following venous insufficiency study with vascular surgeon to discuss further management options.    Orders:    VAS Lower extremity venous insufficiency duplex, Single leg w/ measurements; Future    VAS ivc/iliac veins duplex; limited study; Future

## 2025-04-03 ENCOUNTER — OFFICE VISIT (OUTPATIENT)
Dept: FAMILY MEDICINE CLINIC | Facility: CLINIC | Age: 50
End: 2025-04-03

## 2025-04-03 VITALS
WEIGHT: 171 LBS | SYSTOLIC BLOOD PRESSURE: 138 MMHG | TEMPERATURE: 97.5 F | BODY MASS INDEX: 29.19 KG/M2 | OXYGEN SATURATION: 97 % | DIASTOLIC BLOOD PRESSURE: 90 MMHG | HEART RATE: 94 BPM | HEIGHT: 64 IN | RESPIRATION RATE: 16 BRPM

## 2025-04-03 DIAGNOSIS — Z12.11 SCREENING FOR COLORECTAL CANCER: Primary | ICD-10-CM

## 2025-04-03 DIAGNOSIS — N95.1 PERIMENOPAUSAL: ICD-10-CM

## 2025-04-03 DIAGNOSIS — E78.5 HYPERLIPIDEMIA, UNSPECIFIED HYPERLIPIDEMIA TYPE: ICD-10-CM

## 2025-04-03 DIAGNOSIS — I10 PRIMARY HYPERTENSION: ICD-10-CM

## 2025-04-03 DIAGNOSIS — K21.9 GASTROESOPHAGEAL REFLUX DISEASE WITHOUT ESOPHAGITIS: ICD-10-CM

## 2025-04-03 DIAGNOSIS — Z13.820 SCREENING FOR OSTEOPOROSIS: ICD-10-CM

## 2025-04-03 DIAGNOSIS — G89.29 CHRONIC MIDLINE LOW BACK PAIN WITH BILATERAL SCIATICA: ICD-10-CM

## 2025-04-03 DIAGNOSIS — Z12.12 SCREENING FOR COLORECTAL CANCER: Primary | ICD-10-CM

## 2025-04-03 DIAGNOSIS — E55.9 VITAMIN D DEFICIENCY: ICD-10-CM

## 2025-04-03 DIAGNOSIS — T78.40XA ALLERGIC DISORDER, INITIAL ENCOUNTER: ICD-10-CM

## 2025-04-03 DIAGNOSIS — M54.41 CHRONIC MIDLINE LOW BACK PAIN WITH BILATERAL SCIATICA: ICD-10-CM

## 2025-04-03 DIAGNOSIS — M54.42 CHRONIC MIDLINE LOW BACK PAIN WITH BILATERAL SCIATICA: ICD-10-CM

## 2025-04-03 DIAGNOSIS — K59.00 CONSTIPATION, UNSPECIFIED CONSTIPATION TYPE: ICD-10-CM

## 2025-04-03 PROCEDURE — 99213 OFFICE O/P EST LOW 20 MIN: CPT | Performed by: NURSE PRACTITIONER

## 2025-04-03 RX ORDER — CETIRIZINE HYDROCHLORIDE 10 MG/1
10 TABLET ORAL DAILY
Qty: 90 TABLET | Refills: 1 | Status: SHIPPED | OUTPATIENT
Start: 2025-04-03

## 2025-04-03 RX ORDER — ATORVASTATIN CALCIUM 10 MG/1
10 TABLET, FILM COATED ORAL DAILY
Qty: 90 TABLET | Refills: 1 | Status: SHIPPED | OUTPATIENT
Start: 2025-04-03

## 2025-04-03 RX ORDER — KETOTIFEN FUMARATE 0.35 MG/ML
1 SOLUTION/ DROPS OPHTHALMIC 2 TIMES DAILY
Qty: 5 ML | Refills: 2 | Status: SHIPPED | OUTPATIENT
Start: 2025-04-03

## 2025-04-03 RX ORDER — PANTOPRAZOLE SODIUM 20 MG/1
20 TABLET, DELAYED RELEASE ORAL DAILY
Qty: 90 TABLET | Refills: 2 | Status: SHIPPED | OUTPATIENT
Start: 2025-04-03

## 2025-04-03 RX ORDER — POLYETHYLENE GLYCOL 3350 17 G/17G
17 POWDER, FOR SOLUTION ORAL DAILY
Qty: 578 G | Refills: 1 | Status: SHIPPED | OUTPATIENT
Start: 2025-04-03

## 2025-04-03 RX ORDER — ERGOCALCIFEROL 1.25 MG/1
50000 CAPSULE, LIQUID FILLED ORAL 2 TIMES WEEKLY
Qty: 24 CAPSULE | Refills: 0 | Status: SHIPPED | OUTPATIENT
Start: 2025-04-03

## 2025-04-03 RX ORDER — AMLODIPINE BESYLATE 5 MG/1
5 TABLET ORAL DAILY
Qty: 90 TABLET | Refills: 3 | Status: SHIPPED | OUTPATIENT
Start: 2025-04-03

## 2025-04-03 NOTE — PROGRESS NOTES
Name: Mira Evans      : 1975      MRN: [id number]  Encounter Provider: HAYDER Goff  Encounter Date: 4/3/2025   Encounter department: [facility]  :  Assessment & Plan  Primary hypertension  Within goal, continue current - amlodipine 5 mg daily     Orders:    amLODIPine (NORVASC) 5 mg tablet; Take 1 tablet (5 mg total) by mouth daily      Hyperlipidemia, unspecified hyperlipidemia type  Lab Results   Component Value Date    CHOLESTEROL 197 2024    CHOLESTEROL 210 (H) 2023    CHOLESTEROL 213 (H) 10/01/2022     Lab Results   Component Value Date    HDL 51 2024    HDL 52 2023    HDL 39 (L) 10/01/2022     Lab Results   Component Value Date    TRIG 103 2024    TRIG 116 2023    TRIG 124 10/01/2022     Lab Results   Component Value Date    NONHDLC 146 2024    NONHDLC 158 2023    NONHDLC 174 10/01/2022     Lab Results   Component Value Date    LDLCALC 125 (H) 2024     Continue with atorvastatin 10 mg daily   Orders:    atorvastatin (LIPITOR) 10 mg tablet; Take 1 tablet (10 mg total) by mouth daily    Vitamin D deficiency    Orders:    ergocalciferol (VITAMIN D2) 50,000 units; Take 1 capsule (50,000 Units total) by mouth 2 (two) times a week    Gastroesophageal reflux disease without esophagitis  -Discussed diet and lifestyle interventions to improve sx including: avoidance of common triggers (chocolate, caffeine, tomatoes, citrus), eat small meals frequently, remain upright after meals   -continue PPI   Orders:    pantoprazole (PROTONIX) 20 mg tablet; Take 1 tablet (20 mg total) by mouth daily        Constipation, unspecified constipation type    Orders:    polyethylene glycol (GLYCOLAX) 17 GM/SCOOP powder; Take 17 g by mouth daily    Screening for colorectal cancer  Had colonoscopy in DR 3 years and was normal   She will try to find reports to bring to next visit        Chronic midline low back pain with  bilateral sciatica  Currently stable   Complete previously ordered imaging when able (deferred due to cost)           Allergic disorder, initial encounter  Pruritus of eyes    Orders:    Ketotifen Fumarate (ZADITOR) 0.035 % ophthalmic solution; Administer 1 drop to both eyes 2 (two) times a day    cetirizine (ZyrTEC) 10 mg tablet; Take 1 tablet (10 mg total) by mouth daily    Screening for osteoporosis    Orders:    DXA bone density spine hip and pelvis; Future    Perimenopausal  Encouraged healthy eating and increased exercise/ activity to decrease/ minimize symptoms              BMI Counseling: Body mass index is 29.35 kg/m². The BMI is above normal. Nutrition recommendations include decreasing fast food intake, consuming healthier snacks and limiting drinks that contain sugar. Exercise recommendations include exercising 3-5 times per week. Rationale for BMI follow-up plan is due to patient being overweight or obese.       History of Present Illness   Patient is a 48 YO female who  has a past medical history of Asthma, H. pylori infection, Hyperlipidemia, and Hypertension who presents today for follow up.    She reports that she was seen by the vascular specialist and was recommended to complete studies but she was not able to due to her insurance not covering the testing. She also reports that her menstrual cycle has been different, lighter and more painful than before for the past several months.  She declines any vaccines today.       The following portions of the patient's history were reviewed and updated as appropriate: allergies, current medications, past family history, past medical history, past social history, past surgical history and problem list.              Review of Systems   Constitutional:  Negative for chills and fever.   HENT:  Negative for ear pain and sore throat.    Eyes:  Negative for pain and visual disturbance.   Respiratory:  Negative for cough and shortness of breath.    Cardiovascular:  " Negative for chest pain and palpitations.   Gastrointestinal:  Negative for abdominal pain and vomiting.   Genitourinary:  Positive for menstrual problem. Negative for dysuria and hematuria.   Musculoskeletal:  Negative for arthralgias and back pain.   Skin:  Negative for color change and rash.   Neurological:  Negative for seizures and syncope.   All other systems reviewed and are negative.      Objective   /90 (BP Location: Right arm, Patient Position: Sitting, Cuff Size: Standard)   Pulse 94   Temp 97.5 °F (36.4 °C) (Temporal)   Resp 16   Ht 5' 4\" (1.626 m)   Wt 77.6 kg (171 lb)   SpO2 97%   BMI 29.35 kg/m²      Physical Exam  Vitals and nursing note reviewed.   Constitutional:       General: She is not in acute distress.     Appearance: She is well-developed.   HENT:      Head: Normocephalic and atraumatic.      Right Ear: External ear normal.      Left Ear: External ear normal.   Eyes:      Conjunctiva/sclera: Conjunctivae normal.   Cardiovascular:      Rate and Rhythm: Normal rate and regular rhythm.   Pulmonary:      Effort: Pulmonary effort is normal. No respiratory distress.      Breath sounds: Normal breath sounds.   Musculoskeletal:         General: No swelling.      Cervical back: Neck supple.   Skin:     General: Skin is warm and dry.      Capillary Refill: Capillary refill takes less than 2 seconds.   Neurological:      Mental Status: She is alert and oriented to person, place, and time.   Psychiatric:         Mood and Affect: Mood normal.         "

## 2025-04-03 NOTE — ASSESSMENT & PLAN NOTE
Lab Results   Component Value Date    CHOLESTEROL 197 11/16/2024    CHOLESTEROL 210 (H) 12/23/2023    CHOLESTEROL 213 (H) 10/01/2022     Lab Results   Component Value Date    HDL 51 11/16/2024    HDL 52 12/23/2023    HDL 39 (L) 10/01/2022     Lab Results   Component Value Date    TRIG 103 11/16/2024    TRIG 116 12/23/2023    TRIG 124 10/01/2022     Lab Results   Component Value Date    NONHDLC 146 11/16/2024    NONHDLC 158 12/23/2023    NONHDLC 174 10/01/2022     Lab Results   Component Value Date    LDLCALC 125 (H) 11/16/2024     Continue with atorvastatin 10 mg daily   Orders:    atorvastatin (LIPITOR) 10 mg tablet; Take 1 tablet (10 mg total) by mouth daily

## 2025-04-03 NOTE — ASSESSMENT & PLAN NOTE
Had colonoscopy in DR 3 years and was normal   She will try to find reports to bring to next visit

## 2025-04-03 NOTE — ASSESSMENT & PLAN NOTE
-Discussed diet and lifestyle interventions to improve sx including: avoidance of common triggers (chocolate, caffeine, tomatoes, citrus), eat small meals frequently, remain upright after meals   -continue PPI   Orders:    pantoprazole (PROTONIX) 20 mg tablet; Take 1 tablet (20 mg total) by mouth daily

## 2025-04-09 ENCOUNTER — TELEPHONE (OUTPATIENT)
Dept: FAMILY MEDICINE CLINIC | Facility: CLINIC | Age: 50
End: 2025-04-09

## 2025-04-14 ENCOUNTER — TELEPHONE (OUTPATIENT)
Dept: FAMILY MEDICINE CLINIC | Facility: CLINIC | Age: 50
End: 2025-04-14

## 2025-04-23 ENCOUNTER — OFFICE VISIT (OUTPATIENT)
Dept: OBGYN CLINIC | Facility: CLINIC | Age: 50
End: 2025-04-23

## 2025-04-23 VITALS — WEIGHT: 171.8 LBS | DIASTOLIC BLOOD PRESSURE: 90 MMHG | BODY MASS INDEX: 29.49 KG/M2 | SYSTOLIC BLOOD PRESSURE: 130 MMHG

## 2025-04-23 DIAGNOSIS — N92.6 ABNORMAL MENSES: Primary | ICD-10-CM

## 2025-04-23 PROCEDURE — 99213 OFFICE O/P EST LOW 20 MIN: CPT | Performed by: NURSE PRACTITIONER

## 2025-04-23 NOTE — PROGRESS NOTES
Name: Mira Evans      : 1975      MRN: [id number]  Encounter Provider: HAYDER Crandall  Encounter Date: 2025   Encounter department: [facility]  :  Assessment & Plan  Abnormal menses    Orders:    US pelvis complete w transvaginal; Future    Plan  Schedule pelvic U/S  Return for annual GYN exam and U/S results with doctor, after 5/15/ 2025  Call with return of vaginal bleedibg  Pt verbalized understanding of all discussed.      History of Present Illness   HPI  Mira Evans is a 49 y.o. female who presents with a Hx of a 16 day period this month that has now finished  Pt states a few years ago the same thing occurred at that time she had a WNL endometrial caliber on pelvic U/S  Pt states she normally has a 5 day period per month    Offered a pelvic exam, pt stated she will need a pelvic exam when she returns for annual GYN exam so she declines today since VB has stopped  Pt was advised to call with return of VB          Review of Systems  .Pertinent items are note in the HPI         Objective   /90 (BP Location: Left arm, Patient Position: Sitting, Cuff Size: Standard)   Wt 77.9 kg (171 lb 12.8 oz)   LMP 2025 (Exact Date)   BMI 29.49 kg/m²      Physical Exam  Vitals reviewed.   Constitutional:       Appearance: Normal appearance.   Eyes:      General:         Right eye: No discharge.         Left eye: No discharge.   Pulmonary:      Effort: Pulmonary effort is normal. No respiratory distress.   Musculoskeletal:         General: Normal range of motion.      Cervical back: Normal range of motion.   Neurological:      Mental Status: She is alert and oriented to person, place, and time.   Psychiatric:         Mood and Affect: Mood normal.         Behavior: Behavior normal.         Thought Content: Thought content normal.     Negative cough or SOB

## 2025-04-23 NOTE — PATIENT INSTRUCTIONS
Schedule pelvic U/S  Return for annual GYN exam and U/S results with doctor, after 5/15/ 2025  Call with return of vaginal bleeding

## 2025-04-24 ENCOUNTER — HOSPITAL ENCOUNTER (OUTPATIENT)
Dept: ULTRASOUND IMAGING | Facility: HOSPITAL | Age: 50
End: 2025-04-24
Attending: NURSE PRACTITIONER
Payer: COMMERCIAL

## 2025-04-24 DIAGNOSIS — N92.6 ABNORMAL MENSES: ICD-10-CM

## 2025-04-24 PROCEDURE — 76856 US EXAM PELVIC COMPLETE: CPT

## 2025-04-24 PROCEDURE — 76830 TRANSVAGINAL US NON-OB: CPT

## 2025-05-03 PROBLEM — Z12.12 SCREENING FOR COLORECTAL CANCER: Status: RESOLVED | Noted: 2025-04-03 | Resolved: 2025-05-03

## 2025-05-03 PROBLEM — Z12.11 SCREENING FOR COLORECTAL CANCER: Status: RESOLVED | Noted: 2025-04-03 | Resolved: 2025-05-03

## 2025-05-21 NOTE — PROGRESS NOTES
"OB/GYN VISIT  Mira Evans  2025  6:21 PM    Subjective:    Mira Evans is a 49 y.o.  female who presents for annual well woman exam.        Mira was previously seen for irregular menstruation. She reported having a 16 day cycle. We reviewed the results of her US including endometrial thickness of 10 mm. We reviewed that this may be secondary to perimenopausal status but in the setting of her US results I recommend returning for endometrial biopsy which she is in agreement       Review of Systems  Periods are irregular  Denies vaginal discharge, labial erythema or lesions, dyspareunia.  Current contraception: none  History of abnormal Pap smear: no  Family history of breast, endometrial, uterine or ovarian cancer: no  History of abnormal mammogram: no  STI hx: none  GYN surgeries:  x 2        Screening  Cervical Cancer Screening:  Last Pap: 2022 NILM, HPV negative. Next Pap Due: 2027  Breast Cancer Screening: Last Mammogram 2024, 2025  Colon Cancer Screening: Last colonoscopy  in DR, next due reports that it was normal at that time       Health Maintenance:    She exercises 0 days per week.  She feels safe at home.   She does not follow a well balanced diet.    She does not use tobacco      OB History    Para Term  AB Living   2 2 2 0 0 2   SAB IAB Ectopic Multiple Live Births   0 0 0 0 2      # Outcome Date GA Lbr Ernst/2nd Weight Sex Type Anes PTL Lv   2 Term            1 Term                Past Medical History[1]    Past Surgical History[2]     Objective:  LMP 2025 (Exact Date)  There is no height or weight on file to calculate BMI.     Physical Exam:  GEN: The patient was alert and oriented x3, pleasant well-appearing female in no acute distress.   HEENT:  Unremarkable, no anterior or posterior lymphadenopathy, no thyromegaly, small mole/skin tag on the right chest just lateral to the neck, patient reports using \"cream\" from DR to attempt " removal   CV:  Regular rate   RESP:  Unlabored breathing  BREAST:  Symmetric breasts with no palpable breast masses or obvious breast lesions. She has no retractions or nipple discharge. She has no axillary abnormalities or palpable masses.   GI:  Soft, nontender, non-distended  MSK: bilateral lower extremities are nontender, no edema  : Normal appearing external female genitalia, normal appearing urethral meatus. On sterile speculum exam,  normal appearing vaginal epithelium, no vaginal discharge, no bleeding, grossly normal appearing cervix     ASSESSMENT/PLAN: Mira Evans is a 49 y.o.  who presents for annual gynecologic exam.    50-65 postmenopausal  1.  Routine well woman exam done today.  2.  Pap and HPV:Pap with HPV was not done today.  Current [facility]CCP Guidelines reviewed.   3.  Mammogram ordered. Recommend yearly mammography.   4.  Colonoscopy recommended per guidelines.   5. With mole/skin tag on right chest with scab surrounding, patient reports using a cream from the DR to remove the lesion without success. Recommend referral to PCP for lesion removal   6. Reviewed AUB and prolonged menses/endometrial thickness on TVUS, recommend endometrial biopsy and she is in agreement    7. Patient to return to office for endometrial biopsy        Problem List Items Addressed This Visit    None        Isela Byrne MD  Obstetrics & Gynecology PGY-4         [1]   Past Medical History:  Diagnosis Date    Asthma     H. pylori infection     Hyperlipidemia     Hypertension    [2]   Past Surgical History:  Procedure Laterality Date     SECTION      ,     TUBAL LIGATION Bilateral

## 2025-05-22 ENCOUNTER — ANNUAL EXAM (OUTPATIENT)
Dept: OBGYN CLINIC | Facility: CLINIC | Age: 50
End: 2025-05-22

## 2025-05-22 VITALS — WEIGHT: 172 LBS | SYSTOLIC BLOOD PRESSURE: 138 MMHG | BODY MASS INDEX: 29.52 KG/M2 | DIASTOLIC BLOOD PRESSURE: 88 MMHG

## 2025-05-22 DIAGNOSIS — D22.9 BLEEDING SKIN MOLE: ICD-10-CM

## 2025-05-22 DIAGNOSIS — R23.3 BLEEDING SKIN MOLE: ICD-10-CM

## 2025-05-22 DIAGNOSIS — Z12.31 ENCOUNTER FOR SCREENING MAMMOGRAM FOR MALIGNANT NEOPLASM OF BREAST: Primary | ICD-10-CM

## 2025-06-23 ENCOUNTER — PROCEDURE VISIT (OUTPATIENT)
Dept: OBGYN CLINIC | Facility: CLINIC | Age: 50
End: 2025-06-23

## 2025-06-23 VITALS — DIASTOLIC BLOOD PRESSURE: 80 MMHG | WEIGHT: 166.2 LBS | SYSTOLIC BLOOD PRESSURE: 130 MMHG | BODY MASS INDEX: 28.53 KG/M2

## 2025-06-23 DIAGNOSIS — R93.89 THICKENED ENDOMETRIUM: Primary | ICD-10-CM

## 2025-06-23 DIAGNOSIS — N92.0 MENORRHAGIA WITH REGULAR CYCLE: ICD-10-CM

## 2025-06-23 DIAGNOSIS — N95.1 PERIMENOPAUSAL SYMPTOM: ICD-10-CM

## 2025-06-23 LAB — SL AMB POCT URINE HCG: NEGATIVE

## 2025-06-23 PROCEDURE — 88305 TISSUE EXAM BY PATHOLOGIST: CPT | Performed by: PATHOLOGY

## 2025-06-23 PROCEDURE — 81025 URINE PREGNANCY TEST: CPT | Performed by: OBSTETRICS & GYNECOLOGY

## 2025-06-23 PROCEDURE — 58100 BIOPSY OF UTERUS LINING: CPT | Performed by: OBSTETRICS & GYNECOLOGY

## 2025-06-23 NOTE — PROGRESS NOTES
"Endometrial biopsy    Date/Time: 6/23/2025 3:30 PM    Performed by: Yardlie Toussaint-Foster, DO  Authorized by: Yardlie Toussaint-Foster, DO    Universal Protocol:  Consent: Verbal consent obtained. Written consent obtained  Risks and benefits: risks, benefits and alternatives were discussed  Consent given by: patient  Time out: Immediately prior to procedure a \"time out\" was called to verify the correct patient, procedure, equipment, support staff and site/side marked as required.  Timeout called at: 6/23/2025 3:48 PM.  Patient understanding: patient states understanding of the procedure being performed  Patient consent: the patient's understanding of the procedure matches consent given  Procedure consent: procedure consent matches procedure scheduled  Relevant documents: relevant documents present and verified  Test results: test results available and properly labeled  Radiology Images displayed and confirmed. If images not available, report reviewed: imaging studies available  Patient identity confirmed: verbally with patient and provided demographic data    Indication:     Indications: Other disorder of menstruation and other abnormal bleeding from female genital tract      Indications comment:  Thickened endometrium & menorrhagia  Pre-procedure:     Negative urine pregnancy test: yes    Procedure:     Procedure: endometrial biopsy with Pipelle      A bivalve speculum was placed in the vagina: yes      Cervix cleaned and prepped: yes      The cervix was dilated: yes      Uterus sounded: yes      Uterus sound depth (cm):  7    Specimen collected: specimen collected and sent to pathology      Patient tolerated procedure well with no complications: yes       "

## 2025-06-23 NOTE — PATIENT INSTRUCTIONS
Justin por evans confianza en nuestro equipo.   Le agradecemos y agradecemos nohelia comentarios.   Si recibe norman encuesta nuestra, tómese unos momentos para informarnos cómo estamos.   Sinceramente,  Yamegane Toussaint-Foster, DO

## 2025-07-30 ENCOUNTER — OFFICE VISIT (OUTPATIENT)
Dept: OBGYN CLINIC | Facility: CLINIC | Age: 50
End: 2025-07-30

## 2025-07-30 PROBLEM — R93.89 THICKENED ENDOMETRIUM: Status: RESOLVED | Noted: 2025-06-23 | Resolved: 2025-07-30

## 2025-07-31 ENCOUNTER — TELEPHONE (OUTPATIENT)
Dept: FAMILY MEDICINE CLINIC | Facility: CLINIC | Age: 50
End: 2025-07-31

## 2025-07-31 DIAGNOSIS — I10 PRIMARY HYPERTENSION: ICD-10-CM

## 2025-07-31 RX ORDER — AMLODIPINE BESYLATE 5 MG/1
5 TABLET ORAL DAILY
Qty: 90 TABLET | Refills: 3 | Status: SHIPPED | OUTPATIENT
Start: 2025-07-31

## 2025-08-21 ENCOUNTER — OFFICE VISIT (OUTPATIENT)
Dept: FAMILY MEDICINE CLINIC | Facility: CLINIC | Age: 50
End: 2025-08-21

## 2025-08-21 VITALS
HEART RATE: 69 BPM | SYSTOLIC BLOOD PRESSURE: 132 MMHG | RESPIRATION RATE: 16 BRPM | BODY MASS INDEX: 28.17 KG/M2 | HEIGHT: 64 IN | DIASTOLIC BLOOD PRESSURE: 90 MMHG | WEIGHT: 165 LBS | TEMPERATURE: 97.8 F | OXYGEN SATURATION: 99 %

## 2025-08-21 DIAGNOSIS — G89.29 CHRONIC LEFT SHOULDER PAIN: ICD-10-CM

## 2025-08-21 DIAGNOSIS — E55.9 VITAMIN D DEFICIENCY: ICD-10-CM

## 2025-08-21 DIAGNOSIS — T78.40XA ALLERGIC DISORDER, INITIAL ENCOUNTER: ICD-10-CM

## 2025-08-21 DIAGNOSIS — M54.42 CHRONIC MIDLINE LOW BACK PAIN WITH BILATERAL SCIATICA: ICD-10-CM

## 2025-08-21 DIAGNOSIS — M25.512 CHRONIC LEFT SHOULDER PAIN: ICD-10-CM

## 2025-08-21 DIAGNOSIS — I10 PRIMARY HYPERTENSION: ICD-10-CM

## 2025-08-21 DIAGNOSIS — N95.1 PERIMENOPAUSAL: ICD-10-CM

## 2025-08-21 DIAGNOSIS — G89.29 CHRONIC MIDLINE LOW BACK PAIN WITH BILATERAL SCIATICA: ICD-10-CM

## 2025-08-21 DIAGNOSIS — E78.5 HYPERLIPIDEMIA, UNSPECIFIED HYPERLIPIDEMIA TYPE: Primary | ICD-10-CM

## 2025-08-21 DIAGNOSIS — M54.41 CHRONIC MIDLINE LOW BACK PAIN WITH BILATERAL SCIATICA: ICD-10-CM

## 2025-08-21 PROCEDURE — 99214 OFFICE O/P EST MOD 30 MIN: CPT | Performed by: NURSE PRACTITIONER

## 2025-08-21 RX ORDER — ERGOCALCIFEROL 1.25 MG/1
50000 CAPSULE, LIQUID FILLED ORAL 2 TIMES WEEKLY
Qty: 24 CAPSULE | Refills: 0 | Status: SHIPPED | OUTPATIENT
Start: 2025-08-21

## 2025-08-21 RX ORDER — VENLAFAXINE HYDROCHLORIDE 37.5 MG/1
37.5 CAPSULE, EXTENDED RELEASE ORAL
Qty: 30 CAPSULE | Refills: 5 | Status: SHIPPED | OUTPATIENT
Start: 2025-08-21

## 2025-08-21 RX ORDER — AMLODIPINE BESYLATE 5 MG/1
5 TABLET ORAL DAILY
Qty: 90 TABLET | Refills: 3 | Status: SHIPPED | OUTPATIENT
Start: 2025-08-21

## 2025-08-21 RX ORDER — ATORVASTATIN CALCIUM 10 MG/1
10 TABLET, FILM COATED ORAL DAILY
Qty: 90 TABLET | Refills: 1 | Status: SHIPPED | OUTPATIENT
Start: 2025-08-21